# Patient Record
Sex: MALE | Race: WHITE | NOT HISPANIC OR LATINO | Employment: FULL TIME | URBAN - METROPOLITAN AREA
[De-identification: names, ages, dates, MRNs, and addresses within clinical notes are randomized per-mention and may not be internally consistent; named-entity substitution may affect disease eponyms.]

---

## 2018-01-08 ENCOUNTER — OFFICE VISIT CONVERTED (OUTPATIENT)
Dept: ORTHOPEDIC SURGERY | Facility: CLINIC | Age: 53
End: 2018-01-08
Attending: ORTHOPAEDIC SURGERY

## 2018-06-11 ENCOUNTER — OFFICE VISIT CONVERTED (OUTPATIENT)
Dept: ORTHOPEDIC SURGERY | Facility: CLINIC | Age: 53
End: 2018-06-11
Attending: ORTHOPAEDIC SURGERY

## 2018-08-06 ENCOUNTER — OFFICE VISIT CONVERTED (OUTPATIENT)
Dept: ORTHOPEDIC SURGERY | Facility: CLINIC | Age: 53
End: 2018-08-06
Attending: ORTHOPAEDIC SURGERY

## 2018-11-02 ENCOUNTER — OFFICE VISIT CONVERTED (OUTPATIENT)
Dept: ORTHOPEDIC SURGERY | Facility: CLINIC | Age: 53
End: 2018-11-02
Attending: ORTHOPAEDIC SURGERY

## 2019-03-28 ENCOUNTER — OFFICE VISIT CONVERTED (OUTPATIENT)
Dept: PODIATRY | Facility: CLINIC | Age: 54
End: 2019-03-28
Attending: PODIATRIST

## 2019-06-11 ENCOUNTER — CONVERSION ENCOUNTER (OUTPATIENT)
Dept: PODIATRY | Facility: CLINIC | Age: 54
End: 2019-06-11

## 2019-06-11 ENCOUNTER — OFFICE VISIT CONVERTED (OUTPATIENT)
Dept: PODIATRY | Facility: CLINIC | Age: 54
End: 2019-06-11
Attending: PODIATRIST

## 2019-06-25 ENCOUNTER — OFFICE VISIT CONVERTED (OUTPATIENT)
Dept: SURGERY | Facility: CLINIC | Age: 54
End: 2019-06-25
Attending: PHYSICIAN ASSISTANT

## 2019-06-25 ENCOUNTER — CONVERSION ENCOUNTER (OUTPATIENT)
Dept: SURGERY | Facility: CLINIC | Age: 54
End: 2019-06-25

## 2019-09-26 ENCOUNTER — HOSPITAL ENCOUNTER (OUTPATIENT)
Dept: OTHER | Facility: HOSPITAL | Age: 54
Discharge: HOME | End: 2019-09-26
Attending: EMERGENCY MEDICINE

## 2019-10-19 ENCOUNTER — HOSPITAL ENCOUNTER (OUTPATIENT)
Dept: MRI IMAGING | Facility: HOSPITAL | Age: 54
Discharge: HOME | End: 2019-10-19
Attending: EMERGENCY MEDICINE

## 2019-11-07 ENCOUNTER — OFFICE VISIT CONVERTED (OUTPATIENT)
Dept: ORTHOPEDIC SURGERY | Facility: CLINIC | Age: 54
End: 2019-11-07
Attending: ORTHOPAEDIC SURGERY

## 2019-12-02 ENCOUNTER — HOSPITAL ENCOUNTER (OUTPATIENT)
Dept: PERIOP | Facility: HOSPITAL | Age: 54
Setting detail: HOSPITAL OUTPATIENT SURGERY
Discharge: HOME | End: 2019-12-02
Attending: ORTHOPAEDIC SURGERY

## 2019-12-17 ENCOUNTER — OFFICE VISIT CONVERTED (OUTPATIENT)
Dept: ORTHOPEDIC SURGERY | Facility: CLINIC | Age: 54
End: 2019-12-17
Attending: ORTHOPAEDIC SURGERY

## 2020-01-28 ENCOUNTER — OFFICE VISIT CONVERTED (OUTPATIENT)
Dept: ORTHOPEDIC SURGERY | Facility: CLINIC | Age: 55
End: 2020-01-28
Attending: PHYSICIAN ASSISTANT

## 2020-03-12 ENCOUNTER — CONVERSION ENCOUNTER (OUTPATIENT)
Dept: ORTHOPEDIC SURGERY | Facility: CLINIC | Age: 55
End: 2020-03-12

## 2020-03-12 ENCOUNTER — OFFICE VISIT CONVERTED (OUTPATIENT)
Dept: ORTHOPEDIC SURGERY | Facility: CLINIC | Age: 55
End: 2020-03-12
Attending: PHYSICIAN ASSISTANT

## 2021-02-15 ENCOUNTER — OFFICE VISIT CONVERTED (OUTPATIENT)
Dept: OTOLARYNGOLOGY | Facility: CLINIC | Age: 56
End: 2021-02-15
Attending: OTOLARYNGOLOGY

## 2021-04-05 ENCOUNTER — OFFICE VISIT CONVERTED (OUTPATIENT)
Dept: OTOLARYNGOLOGY | Facility: CLINIC | Age: 56
End: 2021-04-05
Attending: OTOLARYNGOLOGY

## 2021-04-28 ENCOUNTER — OFFICE VISIT CONVERTED (OUTPATIENT)
Dept: OTOLARYNGOLOGY | Facility: CLINIC | Age: 56
End: 2021-04-28
Attending: OTOLARYNGOLOGY

## 2021-05-07 ENCOUNTER — OFFICE VISIT CONVERTED (OUTPATIENT)
Dept: OTOLARYNGOLOGY | Facility: CLINIC | Age: 56
End: 2021-05-07
Attending: OTOLARYNGOLOGY

## 2021-05-10 NOTE — H&P
"   History and Physical      Patient Name: Delgado Jerome   Patient ID: [id number]   Sex: Male   YOB: 1965    Primary Care Provider: Marco A LEBRON   Referring Provider: Marco A LEBRON    Visit Date: February 15, 2021    Provider: Bryson Ayala MD   Location: [facility]   Location Address: [address] [place] [address], [address] [place] [address]  [id number]   Location Phone: [phone]          Chief Complaint     \"My ears itch and I have trouble breathing on my nose.\"       History Of Present Illness     Delgado Jerome is a 55 year old /White male difficult for allergic rhinitis, hyperlipidemia, and GERD who presents to the office today as a consult from Marco A LEBRON for evaluation of multiple complaints.  His initial complaint involves frequent bilateral ear itching.  He tells me that this has been present for a number of years and that a Q-tip seem to be helpful.  He does report bilateral high-pitched tinnitus which has been present since childhood and feels as though he may have some hearing loss.  He reports a history of otitis externa as a child.  He denies any otalgia, otorrhea, or vertigo.  He has not undergone a recent audiogram.  Second complaint involves bilateral nasal congestion which is year-round.  It is occasionally associated with rhinorrhea.  He denies any epistaxis, prior nasal trauma, or prior nasal surgery.  He has undergone allergy testing and is currently into his second year of immunotherapy.  He is using Dymista, Singulair, fexofenadine, and saline irrigations with some improvement.  He does not smoke.       Past Medical History  Allergic rhinitis, chronic; Anemia, Unspecified; Aneurysm; Arthritis; Arthritis, right thumb CMC; Blood Clotting Disorder; Bunion; Chest pain; CMC arthritis--left thumb; Corns and callus; Foot pain, right; GERD; High cholesterol; Medial epicondylitis of elbow, left; Medial epicondylitis, right elbow; Mood disorder; " Haley neuroma, right; Numbness in both legs; Pain: Hand; Plantar warts; Prostate cancer; Prostate cancer screening; Right elbow pain; Right wrist pain; Shortness Of Air; Urgency of urination         Past Surgical History  *Metal Implant; Ankle surgery; Appendectomy; Artificial Joints/Limbs; Joint Surgery; Knee replacement, right         Medication List  Allegra Allergy 180 mg oral tablet; buspirone 15 mg oral tablet; finasteride 5 mg oral tablet; gabapentin 300 mg oral capsule; montelukast 10 mg oral tablet; naproxen 500 mg oral tablet; simvastatin 40 mg oral tablet; Sudafed 30 mg oral tablet; Tylenol PM Extra Strength  mg oral tablet; Vitamin D3 5,000 unit oral tablet         Allergy List  NO KNOWN DRUG ALLERGIES         Family Medical History  Family history of pancreatic cancer; Family history of stroke; Family history of heart disease         Social History  Alcohol (Current some day); Caffeine (Current some day); lives with other; Recreational Drug Use (Never); Second hand smoke exposure (Never); Single.; Tobacco (Never); Working         Review of Systems  · Constitutional  o Admits  o : night sweats  o Denies  o : fever, weight loss  · Eyes  o Admits  o : impaired vision  o Denies  o : discharge from eye  · HENT  o Admits  o : *See HPI  · Cardiovascular  o Denies  o : chest pain, irregular heart beats  · Respiratory  o Denies  o : shortness of breath, wheezing, coughing up blood  · Gastrointestinal  o Admits  o : reflux  o Denies  o : heartburn, vomiting blood  · Genitourinary  o Denies  o : frequency  · Integument  o Admits  o : skin dryness  o Denies  o : rash  · Neurologic  o Denies  o : seizures, loss of balance, loss of consciousness, dizziness  · Endocrine  o Admits  o : cold intolerance  o Denies  o : heat intolerance  · Heme-Lymph  o Denies  o : easy bleeding, anemia      Vitals  Date Time BP Position Site L\R Cuff Size HR RR TEMP (F) WT  HT  BMI kg/m2 BSA m2 O2 Sat FR L/min FiO2 HC      "  02/15/2021 10:55 AM        97.4 159lbs 2oz 5'  5\" 26.48 1.82             Physical Examination  · Constitutional  o Appearance  o : well developed, well-nourished, alert and in no acute distress, voice clear and strong  · Head and Face  o Head  o :   § Inspection  § : no deformities or lesions  o Face  o :   § Inspection  § : No facial lesions; House-Brackmann I/VI bilaterally  § Palpation  § : No TMJ crepitus nor  muscle tenderness bilaterally  · Eyes  o Vision  o :   § Visual Fields  § : Extraocular movements are intact. No spontaneous or gaze-induced nystagmus.  o Conjunctivae  o : clear  o Sclerae  o : clear  o Pupils and Irises  o : pupils equal, round, and reactive to light.   · Ears, Nose, Mouth and Throat  o Ears  o :   § External Ears  § : appearance within normal limits, no lesions present  § Otoscopic Examination  § : Bilateral external auditory canals are dry and flaky. Bilateral tympanic membranes with myringosclerosis.  § Hearing  § : intact to conversational voice both ears  o Nose  o :   § External Nose  § : appearance normal  § Intranasal Exam  § : mucosa within normal limits, vestibules normal, no intranasal lesions present, septum slightly deviated to the left with a prominent maxillary crest bilaterally, inferior turbinates hypertrophied, sinuses non tender to percussion  o Oral Cavity  o :   § Oral Mucosa  § : oral mucosa normal without pallor or cyanosis  § Lips  § : lip appearance normal  § Teeth  § : normal dentition for age  § Gums  § : gums pink, non-swollen, no bleeding present  § Tongue  § : tongue appearance normal; normal mobility  § Palate  § : hard palate normal, soft palate appearance normal with symmetric mobility  o Throat  o :   § Oropharynx  § : no inflammation or lesions present, tonsils within normal limits  § Hypopharynx  § : Deferred secondary to gag reflex  § Larynx  § : Deferred secondary to gag reflex  · Neck  o Inspection/Palpation  o : normal appearance, no " masses or tenderness, trachea midline; thyroid size normal, nontender, no nodules or masses present on palpation  · Respiratory  o Respiratory Effort  o : breathing unlabored  o Inspection of Chest  o : normal appearance, no retractions  · Cardiovascular  o Heart  o : regular rate and rhythm  · Lymphatic  o Neck  o : no lymphadenopathy present  o Supraclavicular Nodes  o : no lymphadenopathy present  o Preauricular Nodes  o : no lymphadenopathy present  · Skin and Subcutaneous Tissue  o General Inspection  o : Regarding face and neck - there are no rashes present, no lesions present, and no areas of discoloration  · Neurologic  o Cranial Nerves  o : cranial nerves II-XII are grossly intact bilaterally  o Gait and Station  o : normal gait, able to stand without diffculty  · Psychiatric  o Judgement and Insight  o : judgment and insight intact  o Mood and Affect  o : mood normal, affect appropriate          Assessment  · Deviated nasal septum     470/J34.2  · Nasal obstruction     478.19/J34.89  · Tinnitus, bilateral     388.30/H93.13  · Ear itching     698.9/L29.9  · Hypertrophy of both inferior nasal turbinates     478.0/J34.3      Plan  · Orders  o Audiometry, comprehensive, threshold evaluation and speech recognition [facility] ([id number]) - 388.30/H93.13 - 02/17/2021  o Tympanogram (Impedance Testing) [facility] ([id number]) - 388.30/H93.13 - 02/17/2021  · Medications  o Medications have been Reconciled  o Transition of Care or Provider Policy  · Instructions  o Impressions and findings were discussed with Mr. Jerome at great length. Currently, he is seen for eval ration of bilateral ear itching, tinnitus, and nasal congestion. He is currently on immunotherapy, Dymista, Singulair, fexofenadine, and saline irrigations without much improvement in his nasal airway. Examination today reveals slight left septal deviation with a prominent maxillary crest bilaterally. Both inferior turbinates are hypertrophied and has nasal cavity overall is  quite narrow. His ears are unremarkable aside from myringosclerosis and dry flaky external auditory canal skin. He was cautioned against Q-tip use and we discussed trying some hydrocortisone cream in his ear canals. If this is not helpful I am happy to prescribe him fluocinolone drops. I recommended an audiogram for further evaluation of his tinnitus and potential hearing loss. In regards to his nasal obstruction we discussed the pathophysiology and natural history of nasal septal deviation inferior turbinate hypertrophy. He has tried treating his allergic rhinitis aggressively without much improvement. Options for management were discussed including continued medical management versus septoplasty with inferior turbinate reduction. The risks, benefits, and alternatives were discussed. The risks include septal perforation, epistaxis, infection, recurrence of congestion, nasal scarring, altered appearance, or CSF leak. After thorough discussion he would like to think about his options and will call once he reaches a decision.  · Correspondence  o ENT Letter to Referring MD (Marco A LEBRON) - 02/17/2021            Electronically Signed by: Bryson Ayala MD -Author on February 17, 2021 08:46:02 AM

## 2021-05-11 NOTE — PROCEDURES
Procedure Note      Patient Name: Delgado Jerome   Patient ID: [id number]   Sex: Male   YOB: 1965    Primary Care Provider: Marco A LEBRON   Referring Provider: Marco A LEBRON    Visit Date: April 28, 2021    Provider: Bryson Ayala MD   Location: [facility]   Location Address: [address] [place] [address], [address]  [id number]   Location Phone: [phone]             Procedure: Bilateral myringotomy and tube placement.      Summary: The patient was brought back to the microscope room and placed supine upon the table.      The patient was brought back to the microscope room and placed reclined in the chair. The microscope was moved into position to view the patient's left ear revealing a normal-appearing external auditory canal and tympanic membrane with a scant mucoid effusion. Phenol was used to anesthetize the tympanic membrane and a radial myringotomy performed in the posterioinferior quadrant. A serous effusion was suctioned and a pressure equalization tube was placed without difficulty.  This procedure was then performed on the patient's right ear.  Ciprodex drops were instilled into the canals. The patient tolerated the procedure well.           Assessment  · ETD (Eustachian tube dysfunction), bilateral     381.81/H69.83  · Conductive hearing loss of both ears     389.06/H90.0      Plan  · Orders  o Myringotomy and Ear Tube placement ([id number]) - 381.81/H69.83, 389.06/H90.0 - 04/28/2021  o Audiometry, comprehensive, threshold evaluation and speech recognition [facility] ([id number]) - 381.81/H69.83, 389.06/H90.0 - 04/28/2021  o Tympanogram (Impedance Testing) [facility] ([id number]) - 381.81/H69.83, 389.06/H90.0 - 04/28/2021  · Instructions  o We discussed post tube care and he will follow-up in 6 weeks with an audiogram or sooner if needed.            Electronically Signed by: Bryson Ayala MD -Author on April 28, 2021 04:52:57 PM

## 2021-05-14 VITALS — WEIGHT: 159 LBS | TEMPERATURE: 98 F | BODY MASS INDEX: 26.49 KG/M2 | HEIGHT: 65 IN

## 2021-05-14 VITALS — HEIGHT: 65 IN | WEIGHT: 159.12 LBS | TEMPERATURE: 97.4 F | BODY MASS INDEX: 26.51 KG/M2

## 2021-05-14 NOTE — PROGRESS NOTES
"   Progress Note      Patient Name: Delgado Jerome   Patient ID: [id number]   Sex: Male   YOB: 1965    Primary Care Provider: Marco A LEBRON   Referring Provider: Marco A LEBRON    Visit Date: April 5, 2021    Provider: Bryson Ayala MD   Location: [facility] [place] [facility]   Location Address: [address] [place] [address], [address] [place] [address]  [id number]   Location Phone: [phone]          Chief Complaint     \"My ears are not itching anymore.\"       History Of Present Illness     Delgado Jerome is a 55 year old /White male difficult for allergic rhinitis, hyperlipidemia, and GERD who returns today for follow-up of bilateral ear itching, tinnitus, hearing loss, and nasal congestion.  He was originally seen on 2/15/2021.  Please see that note for further details.  In short he had been experiencing bilateral high-pitched tinnitus since childhood and bilateral nasal congestion with clear rhinorrhea for which she had been using Dymista, Singulair, fexofenadine, and saline irrigations with some improvement.  He does not smoke.  Examination that day revealed a slight left septal deviation and prominent maxillary crest bilaterally as well as inferior turbinate hypertrophy.  We discussed using hydrocortisone cream in his dry flaky ear canals and an audiogram and tympanogram were ordered for further evaluation of his tinnitus and hearing loss.    He tells me that the hydrocortisone cream has been helpful for his ear itching.  He does report some ear popping and pressure but denies any otalgia, otorrhea, or vertigo.  His tinnitus is stable.  He continues to experience bilateral nasal congestion and is still using Dymista, Singulair, fexofenadine, and saline.  Audiogram on 4/5/2021 revealed right normal downsloping to mild to moderate conductive loss and left mild rising to normal downsloping to mild rising to normal downsloping to moderate conductive loss.  SRT is 10 on the right and 15 on " the left.  Speech discrimination is 96% on the right 90% on the left at 60 dB.  Tympanograms were type C bilaterally.            Past Medical History  Allergic rhinitis, chronic; Anemia, Unspecified; Aneurysm; Arthritis; Arthritis, right thumb CMC; Blood Clotting Disorder; Bunion; Chest pain; CMC arthritis--left thumb; Corns and callus; Deviated nasal septum; Ear itching; Foot pain, right; GERD; High cholesterol; Hypertrophy of both inferior nasal turbinates; Medial epicondylitis of elbow, left; Medial epicondylitis, right elbow; Mood disorder; Haley neuroma, right; Nasal obstruction; Numbness in both legs; Pain: Hand; Plantar warts; Prostate cancer; Prostate cancer screening; Right elbow pain; Right wrist pain; Shortness Of Air; Tinnitus, bilateral; Urgency of urination         Past Surgical History  *Metal Implant; Ankle surgery; Appendectomy; Artificial Joints/Limbs; Joint Surgery; Knee replacement, right         Medication List  Allegra Allergy 180 mg oral tablet; azelastine-fluticasone 137-50 mcg/spray nasal spray,non-aerosol; buspirone 15 mg oral tablet; finasteride 5 mg oral tablet; gabapentin 300 mg oral capsule; montelukast 10 mg oral tablet; naproxen 500 mg oral tablet; simvastatin 40 mg oral tablet; Sudafed 30 mg oral tablet; tolterodine 2 mg oral tablet; Tylenol PM Extra Strength  mg oral tablet; Vitamin D3 5,000 unit oral tablet         Allergy List  NO KNOWN DRUG ALLERGIES         Family Medical History  Family history of pancreatic cancer; Family history of stroke; Family history of heart disease         Social History  Alcohol (Current some day); Caffeine (Current some day); lives with other; Recreational Drug Use (Never); Second hand smoke exposure (Never); Single.; Tobacco (Never); Working         Review of Systems  · Constitutional  o Denies  o : fever, night sweats, weight loss  · Eyes  o Denies  o : discharge from eye, impaired vision  · HENT  o Admits  o : *See  "HPI  · Cardiovascular  o Denies  o : chest pain, irregular heart beats  · Respiratory  o Denies  o : shortness of breath, wheezing, coughing up blood  · Gastrointestinal  o Denies  o : heartburn, reflux, vomiting blood  · Genitourinary  o Denies  o : frequency  · Integument  o Denies  o : rash, skin dryness  · Neurologic  o Denies  o : seizures, loss of balance, loss of consciousness, dizziness  · Endocrine  o Denies  o : cold intolerance, heat intolerance  · Heme-Lymph  o Denies  o : easy bleeding, anemia      Vitals  Date Time BP Position Site L\R Cuff Size HR RR TEMP (F) WT  HT  BMI kg/m2 BSA m2 O2 Sat FR L/min FiO2 HC       04/05/2021 01:49 PM        98 159lbs 0oz 5'  5\" 26.46 1.82             Physical Examination  · Constitutional  o Appearance  o : well developed, well-nourished, alert and in no acute distress, voice clear and strong  · Head and Face  o Head  o :   § Inspection  § : no deformities or lesions  o Face  o :   § Inspection  § : No facial lesions; House-Brackmann I/VI bilaterally  § Palpation  § : No TMJ crepitus nor  muscle tenderness bilaterally  · Eyes  o Vision  o :   § Visual Fields  § : Extraocular movements are intact. No spontaneous or gaze-induced nystagmus.  o Conjunctivae  o : clear  o Sclerae  o : clear  o Pupils and Irises  o : pupils equal, round, and reactive to light.   · Ears, Nose, Mouth and Throat  o Ears  o :   § External Ears  § : appearance within normal limits, no lesions present  § Otoscopic Examination  § : Bilateral external auditory canals are dry and flaky. Bilateral tympanic membranes with scattered myringosclerosis  § Hearing  § : intact to conversational voice both ears  o Nose  o :   § External Nose  § : appearance normal  § Intranasal Exam  § : mucosa within normal limits, vestibules normal, no intranasal lesions present, septum slightly deviated to the left with prominent maxillary crest bilaterally and inferior turbinate hypertrophy,, sinuses non tender " to percussion  o Oral Cavity  o :   § Oral Mucosa  § : oral mucosa normal without pallor or cyanosis  § Lips  § : lip appearance normal  § Teeth  § : normal dentition for age  § Gums  § : gums pink, non-swollen, no bleeding present  § Tongue  § : tongue appearance normal; normal mobility  § Palate  § : hard palate normal, soft palate appearance normal with symmetric mobility  o Throat  o :   § Oropharynx  § : no inflammation or lesions present, tonsils within normal limits  · Neck  o Inspection/Palpation  o : normal appearance, no masses or tenderness, trachea midline; thyroid size normal, nontender, no nodules or masses present on palpation  · Respiratory  o Respiratory Effort  o : breathing unlabored  o Inspection of Chest  o : normal appearance, no retractions  · Cardiovascular  o Heart  o : regular rate and rhythm  · Lymphatic  o Neck  o : no lymphadenopathy present  o Supraclavicular Nodes  o : no lymphadenopathy present  o Preauricular Nodes  o : no lymphadenopathy present  · Skin and Subcutaneous Tissue  o General Inspection  o : Regarding face and neck - there are no rashes present, no lesions present, and no areas of discoloration  · Neurologic  o Cranial Nerves  o : cranial nerves II-XII are grossly intact bilaterally  o Gait and Station  o : normal gait, able to stand without diffculty  · Psychiatric  o Judgement and Insight  o : judgment and insight intact  o Mood and Affect  o : mood normal, affect appropriate          Assessment  · Deviated nasal septum     470/J34.2  · Nasal obstruction     478.19/J34.89  · Conductive hearing loss, bilateral     389.06/H90.0  · Tinnitus, bilateral     388.30/H93.13  · ETD (Eustachian tube dysfunction), bilateral     381.81/H69.83  · Hypertrophy of both inferior nasal turbinates     478.0/J34.3      Plan  · Medications  o Medications have been Reconciled  o Transition of Care or Provider Policy  · Instructions  o Impressions and findings were discussed Mr. Jerome at  great length. Currently, he reports intermittent ear popping and pressure as well as stable tinnitus. We reviewed the results of his 4/5/2021 audiogram which revealed bilateral normal to mild to moderate conductive loss. Tympanograms are type C bilaterally and consistent with eustachian tube dysfunction. We discussed the pathophysiology and natural history of this condition. It is difficult to know if this is partly responsible for his conductive loss. Options for further evaluation and management were discussed including potential tube placement. The risks, benefits, and alternatives were discussed. The risks include persistent drainage from the tubes occasionally requiring removal, blockage of the tubes by drainage, early displacement of the tubes, and tympanic membrane perforation. After a thorough discussion he would like to pursue bilateral myringotomy and tube placement which will be performed in clinic. He would like to hold off on any potential nasal surgeries.            Electronically Signed by: Bryson Ayala MD -Author on April 7, 2021 08:25:52 AM

## 2021-05-15 VITALS — WEIGHT: 147 LBS | BODY MASS INDEX: 24.49 KG/M2 | RESPIRATION RATE: 16 BRPM | HEIGHT: 65 IN

## 2021-05-15 VITALS — OXYGEN SATURATION: 96 % | WEIGHT: 147.37 LBS | HEIGHT: 65 IN | HEART RATE: 60 BPM | BODY MASS INDEX: 24.55 KG/M2

## 2021-05-15 VITALS
DIASTOLIC BLOOD PRESSURE: 67 MMHG | OXYGEN SATURATION: 100 % | SYSTOLIC BLOOD PRESSURE: 92 MMHG | WEIGHT: 149 LBS | BODY MASS INDEX: 24.83 KG/M2 | HEART RATE: 68 BPM | HEIGHT: 65 IN

## 2021-05-15 VITALS — WEIGHT: 152.12 LBS | BODY MASS INDEX: 25.34 KG/M2 | HEART RATE: 89 BPM | OXYGEN SATURATION: 99 % | HEIGHT: 65 IN

## 2021-05-15 VITALS — HEIGHT: 65 IN | OXYGEN SATURATION: 98 % | HEART RATE: 78 BPM | BODY MASS INDEX: 24.49 KG/M2 | WEIGHT: 147 LBS

## 2021-05-15 VITALS
HEIGHT: 65 IN | WEIGHT: 148 LBS | SYSTOLIC BLOOD PRESSURE: 128 MMHG | DIASTOLIC BLOOD PRESSURE: 82 MMHG | BODY MASS INDEX: 24.66 KG/M2 | OXYGEN SATURATION: 97 % | HEART RATE: 73 BPM

## 2021-05-15 VITALS — WEIGHT: 148 LBS | HEIGHT: 65 IN | BODY MASS INDEX: 24.66 KG/M2 | HEART RATE: 71 BPM

## 2021-05-16 VITALS — HEIGHT: 65 IN | HEART RATE: 78 BPM | WEIGHT: 146 LBS | BODY MASS INDEX: 24.32 KG/M2 | OXYGEN SATURATION: 95 %

## 2021-05-16 VITALS — HEART RATE: 71 BPM | HEIGHT: 65 IN | WEIGHT: 147.5 LBS | OXYGEN SATURATION: 97 % | BODY MASS INDEX: 24.57 KG/M2

## 2021-05-16 VITALS — BODY MASS INDEX: 23.32 KG/M2 | OXYGEN SATURATION: 93 % | HEIGHT: 65 IN | HEART RATE: 75 BPM | WEIGHT: 140 LBS

## 2021-05-16 VITALS — WEIGHT: 149 LBS | HEIGHT: 65 IN | OXYGEN SATURATION: 96 % | BODY MASS INDEX: 24.83 KG/M2 | HEART RATE: 68 BPM

## 2021-06-05 NOTE — PROGRESS NOTES
"   Progress Note      Patient Name: Delgado Jerome   Patient ID: [id number]   Sex: Male   YOB: 1965    Primary Care Provider: Marco A LEBRON   Referring Provider: Marco A LEBRON    Visit Date: May 7, 2021    Provider: Bryson Ayala MD   [place] [facility]   Location Address: [address] [place] [address], [address] [place] [address]  [id number]   Location Phone: [phone]          Chief Complaint     \"My left ear almost feels like it did when I was a kid before an infection\"       History Of Present Illness     Delgado Jerome is a 55 year old /White male difficult for allergic rhinitis, hyperlipidemia, and GERD who returns today for follow-up of bilateral ear itching, tinnitus, hearing loss, and nasal congestion.  He was originally seen on 2/15/2021.  Please see that note for further details.  In short he had been experiencing bilateral high-pitched tinnitus since childhood and bilateral nasal congestion with clear rhinorrhea for which she had been using Dymista, Singulair, fexofenadine, and saline irrigations with some improvement.  He does not smoke.  Examination that day revealed a slight left septal deviation and prominent maxillary crest bilaterally as well as inferior turbinate hypertrophy.  We discussed using hydrocortisone cream in his dry flaky ear canals and an audiogram and tympanogram were ordered for further evaluation of his tinnitus and hearing loss. Audiogram on 4/5/2021 revealed right normal downsloping to mild to moderate conductive loss and left mild rising to normal downsloping to mild rising to normal downsloping to moderate conductive loss.  SRT is 10 on the right and 15 on the left.  Speech discrimination is 96% on the right 90% on the left at 60 dB.  Tympanograms were type C bilaterally.     He returns today for follow-up status post bilateral myringotomy and tube placement in clinic on 4/28/2021.  He tells me that he feels like his hearing is " "somewhat better but his bilateral high-pitched tinnitus is maybe a little bit worse.  Yesterday he felt as though there was an almost \"throbbing or whooshing\" feeling in his left ear.  It is not as severe today.  He denies any otorrhea or vertigo.       Past Medical History  Allergic rhinitis, chronic; Anemia, Unspecified; Aneurysm; Arthritis; Arthritis, right thumb CMC; Blood Clotting Disorder; Bunion; Chest pain; CMC arthritis--left thumb; Conductive hearing loss, bilateral; Corns and callus; Deviated nasal septum; Ear itching; ETD (Eustachian tube dysfunction), bilateral; Foot pain, right; GERD; High cholesterol; Hypertrophy of both inferior nasal turbinates; Medial epicondylitis of elbow, left; Medial epicondylitis, right elbow; Mood disorder; Haley neuroma, right; Nasal obstruction; Numbness in both legs; Pain: Hand; Plantar warts; Prostate cancer; Prostate cancer screening; Right elbow pain; Right wrist pain; Shortness Of Air; Tinnitus, bilateral; Urgency of urination         Past Surgical History  *Metal Implant; Ankle surgery; Appendectomy; Artificial Joints/Limbs; Joint Surgery; Knee replacement, right         Medication List  Allegra Allergy 180 mg oral tablet; azelastine-fluticasone 137-50 mcg/spray nasal spray,non-aerosol; buspirone 15 mg oral tablet; finasteride 5 mg oral tablet; gabapentin 300 mg oral capsule; montelukast 10 mg oral tablet; naproxen 500 mg oral tablet; simvastatin 40 mg oral tablet; Sudafed 30 mg oral tablet; tolterodine 2 mg oral tablet; Tylenol PM Extra Strength  mg oral tablet; Vitamin D3 5,000 unit oral tablet         Allergy List  NO KNOWN DRUG ALLERGIES         Family Medical History  Family history of pancreatic cancer; Family history of stroke; Family history of heart disease         Social History  Alcohol (Current some day); Caffeine (Current some day); lives with other; Recreational Drug Use (Never); Second hand smoke exposure (Never); Single.; Tobacco (Never); " "Working         Review of Systems  · Constitutional  o Denies  o : fever, night sweats, weight loss  · Eyes  o Denies  o : discharge from eye, impaired vision  · HENT  o Admits  o : *See HPI  · Cardiovascular  o Denies  o : chest pain, irregular heart beats  · Respiratory  o Denies  o : shortness of breath, wheezing, coughing up blood  · Gastrointestinal  o Denies  o : heartburn, reflux, vomiting blood  · Genitourinary  o Denies  o : frequency  · Integument  o Denies  o : rash, skin dryness  · Neurologic  o Denies  o : seizures, loss of balance, loss of consciousness, dizziness  · Endocrine  o Denies  o : cold intolerance, heat intolerance  · Heme-Lymph  o Denies  o : easy bleeding, anemia      Vitals  Date Time BP Position Site L\R Cuff Size HR RR TEMP (F) WT  HT  BMI kg/m2 BSA m2 O2 Sat FR L/min FiO2 HC       05/07/2021 11:33 AM        97.9 158lbs 2oz 5'  5\" 26.31 1.81             Physical Examination  · Constitutional  o Appearance  o : well developed, well-nourished, alert and in no acute distress, voice clear and strong  · Head and Face  o Head  o :   § Inspection  § : no deformities or lesions  o Face  o :   § Inspection  § : No facial lesions; House-Brackmann I/VI bilaterally  § Palpation  § : No TMJ crepitus nor  muscle tenderness bilaterally  · Eyes  o Vision  o :   § Visual Fields  § : Extraocular movements are intact. No spontaneous or gaze-induced nystagmus.  o Conjunctivae  o : clear  o Sclerae  o : clear  o Pupils and Irises  o : pupils equal, round, and reactive to light.   · Ears, Nose, Mouth and Throat  o Ears  o :   § External Ears  § : appearance within normal limits, no lesions present  § Otoscopic Examination  § : Bilateral external auditory canals are normal in appearance. Bilateral tympanic membranes with pressure equalization tubes in place and patent.  § Hearing  § : intact to conversational voice both ears  o Nose  o :   § External Nose  § : appearance normal  § Intranasal " Exam  § : mucosa within normal limits, vestibules normal, no intranasal lesions present, septum midline, sinuses non tender to percussion  o Oral Cavity  o :   § Oral Mucosa  § : oral mucosa normal without pallor or cyanosis  § Lips  § : lip appearance normal  § Teeth  § : normal dentition for age  § Gums  § : gums pink, non-swollen, no bleeding present  § Tongue  § : tongue appearance normal; normal mobility  § Palate  § : hard palate normal, soft palate appearance normal with symmetric mobility  o Throat  o :   § Oropharynx  § : no inflammation or lesions present, tonsils within normal limits  · Neck  o Inspection/Palpation  o : normal appearance, no masses or tenderness, trachea midline; thyroid size normal, nontender, no nodules or masses present on palpation  · Respiratory  o Respiratory Effort  o : breathing unlabored  o Inspection of Chest  o : normal appearance, no retractions  · Cardiovascular  o Heart  o : regular rate and rhythm  · Lymphatic  o Neck  o : no lymphadenopathy present  o Supraclavicular Nodes  o : no lymphadenopathy present  o Preauricular Nodes  o : no lymphadenopathy present  · Skin and Subcutaneous Tissue  o General Inspection  o : Regarding face and neck - there are no rashes present, no lesions present, and no areas of discoloration  · Neurologic  o Cranial Nerves  o : cranial nerves II-XII are grossly intact bilaterally  o Gait and Station  o : normal gait, able to stand without diffculty  · Psychiatric  o Judgement and Insight  o : judgment and insight intact  o Mood and Affect  o : mood normal, affect appropriate          Assessment  · Conductive hearing loss, bilateral     389.06/H90.0  · Ear pressure, left     388.8/H93.8X2  · ETD (Eustachian tube dysfunction), bilateral     381.81/H69.83      Plan  · Medications  o dexamethasone sodium phosphate 0.1 % ophthalmic (eye) drops   SIG: Place 3-5 drops in the affected ear twice daily 14 days   DISP: (1) Bottle with 1 refills  Prescribed  on 05/07/2021     o Medications have been Reconciled  o Transition of Care or Provider Policy  · Instructions  o Impressions and findings were discussed Mr. Jerome at great length. Currently, both tubes are in place and patent without evidence of infection. We will try him on a course of dexamethasone drops in case there is some inflammation and if things are no better we will move up his audiogram.            Electronically Signed by: Bryson Ayala MD -Author on May 7, 2021 12:00:04 PM

## 2021-06-11 RX ORDER — TOLTERODINE TARTRATE 2 MG/1
2 TABLET, EXTENDED RELEASE ORAL DAILY
COMMUNITY
End: 2022-11-16 | Stop reason: SDUPTHER

## 2021-06-11 RX ORDER — MONTELUKAST SODIUM 10 MG/1
TABLET ORAL
COMMUNITY

## 2021-06-11 RX ORDER — SIMVASTATIN 40 MG
TABLET ORAL
COMMUNITY

## 2021-06-11 RX ORDER — GABAPENTIN 300 MG/1
CAPSULE ORAL
COMMUNITY
End: 2022-11-16 | Stop reason: SDUPTHER

## 2021-06-11 RX ORDER — BUSPIRONE HYDROCHLORIDE 15 MG/1
TABLET ORAL
COMMUNITY

## 2021-06-11 RX ORDER — PSEUDOEPHEDRINE HCL 30 MG
TABLET ORAL
COMMUNITY
End: 2021-06-14

## 2021-06-11 RX ORDER — FLUTICASONE PROPIONATE 50 MCG
SPRAY, SUSPENSION (ML) NASAL
COMMUNITY
End: 2021-06-14

## 2021-06-11 RX ORDER — AZELASTINE HYDROCHLORIDE, FLUTICASONE PROPIONATE 137; 50 UG/1; UG/1
SPRAY, METERED NASAL
COMMUNITY

## 2021-06-11 RX ORDER — FINASTERIDE 5 MG/1
TABLET, FILM COATED ORAL
COMMUNITY

## 2021-06-11 RX ORDER — ACETAMINOPHEN/DIPHENHYDRAMINE 500MG-25MG
1 TABLET ORAL NIGHTLY
COMMUNITY

## 2021-06-11 RX ORDER — ESOMEPRAZOLE MAGNESIUM 40 MG/1
CAPSULE, DELAYED RELEASE ORAL
COMMUNITY
End: 2022-11-16 | Stop reason: SDUPTHER

## 2021-06-11 RX ORDER — AZELASTINE HYDROCHLORIDE 0.5 MG/ML
SOLUTION/ DROPS OPHTHALMIC
COMMUNITY
End: 2021-06-14

## 2021-06-14 ENCOUNTER — PROCEDURE VISIT (OUTPATIENT)
Dept: OTOLARYNGOLOGY | Facility: CLINIC | Age: 56
End: 2021-06-14

## 2021-06-14 ENCOUNTER — OFFICE VISIT (OUTPATIENT)
Dept: OTOLARYNGOLOGY | Facility: CLINIC | Age: 56
End: 2021-06-14

## 2021-06-14 VITALS — BODY MASS INDEX: 26.36 KG/M2 | HEIGHT: 65 IN | WEIGHT: 158.2 LBS | TEMPERATURE: 98.1 F

## 2021-06-14 DIAGNOSIS — H90.3 SENSORY HEARING LOSS, BILATERAL: ICD-10-CM

## 2021-06-14 DIAGNOSIS — H93.13 TINNITUS OF BOTH EARS: ICD-10-CM

## 2021-06-14 DIAGNOSIS — H69.83 DYSFUNCTION OF BOTH EUSTACHIAN TUBES: Primary | ICD-10-CM

## 2021-06-14 DIAGNOSIS — H90.0 CONDUCTIVE HEARING LOSS OF BOTH EARS: ICD-10-CM

## 2021-06-14 DIAGNOSIS — H93.8X2 EAR PRESSURE, LEFT: ICD-10-CM

## 2021-06-14 PROCEDURE — 99213 OFFICE O/P EST LOW 20 MIN: CPT | Performed by: OTOLARYNGOLOGY

## 2021-06-14 PROCEDURE — 92557 COMPREHENSIVE HEARING TEST: CPT | Performed by: AUDIOLOGIST

## 2021-06-14 PROCEDURE — 92567 TYMPANOMETRY: CPT | Performed by: AUDIOLOGIST

## 2021-06-14 RX ORDER — TOLTERODINE 2 MG/1
2 CAPSULE, EXTENDED RELEASE ORAL DAILY
COMMUNITY
Start: 2021-01-05 | End: 2021-06-14

## 2021-06-14 RX ORDER — NAPROXEN 500 MG/1
TABLET ORAL
COMMUNITY
Start: 2021-06-13 | End: 2021-06-14

## 2021-06-14 RX ORDER — FINASTERIDE 5 MG/1
5 TABLET, FILM COATED ORAL DAILY
COMMUNITY
Start: 2021-01-05 | End: 2021-06-14

## 2021-06-14 RX ORDER — AZELASTINE HYDROCHLORIDE, FLUTICASONE PROPIONATE 137; 50 UG/1; UG/1
SPRAY, METERED NASAL
COMMUNITY
Start: 2021-03-08 | End: 2021-06-14

## 2021-06-14 RX ORDER — FLUTICASONE PROPIONATE 50 MCG
2 SPRAY, SUSPENSION (ML) NASAL DAILY
COMMUNITY
Start: 2021-01-05 | End: 2021-06-14

## 2021-06-14 RX ORDER — FEXOFENADINE HCL 180 MG/1
180 TABLET ORAL DAILY
COMMUNITY
Start: 2021-01-05 | End: 2022-12-28

## 2021-06-14 RX ORDER — SIMVASTATIN 40 MG
40 TABLET ORAL DAILY
COMMUNITY
Start: 2021-01-05 | End: 2021-06-14

## 2021-06-14 RX ORDER — NAPROXEN 500 MG/1
500 TABLET ORAL
COMMUNITY
Start: 2021-03-17 | End: 2021-09-13

## 2021-06-14 RX ORDER — GABAPENTIN 600 MG/1
TABLET ORAL
COMMUNITY
Start: 2021-03-17 | End: 2022-11-16 | Stop reason: SDUPTHER

## 2021-06-14 RX ORDER — BUSPIRONE HYDROCHLORIDE 15 MG/1
7.5 TABLET ORAL
COMMUNITY
Start: 2021-01-05 | End: 2021-06-14

## 2021-06-14 RX ORDER — TOLTERODINE 2 MG/1
2 CAPSULE, EXTENDED RELEASE ORAL DAILY
COMMUNITY
Start: 2021-03-30

## 2021-06-14 RX ORDER — ACETAMINOPHEN 500 MG
500 TABLET ORAL DAILY
COMMUNITY

## 2021-06-14 RX ORDER — MONTELUKAST SODIUM 10 MG/1
10 TABLET ORAL DAILY
COMMUNITY
Start: 2021-01-05 | End: 2021-06-14

## 2021-06-14 RX ORDER — FOLIC ACID 1 MG/1
1000 TABLET ORAL DAILY
COMMUNITY
Start: 2021-04-14

## 2021-06-14 NOTE — PROGRESS NOTES
"Patient Name: Delgado Jerome   Visit Date: 06/14/2021   Patient ID: [id number]  Provider: Bryson Ayala MD    Sex: male  [facility]   YOB: 1965  Location Address: [address] [place] [address], [address],?[place]?[id number]    Primary Care Provider Marco A Thomas PA  Location Phone: [phone]    Referring Provider: No ref. provider found        Chief Complaint  No chief complaint on file.    History of Present Illness  Delgado Jerome is a 55 y.o. male who presents to [facility] today for follow-up of bilateral conductive hearing loss, left ear pressure, and bilateral eustachian tube dysfunction.  He was originally seen on 2/15/2021 at which time he reported bilateral high-pitched tinnitus since childhood and nasal congestion for which he had been using Dymista, Singulair, fexofenadine, and saline irrigations with some improvement.  Examination that day revealed a slight left septal deviation and prominent maxillary crest bilaterally as well as inferior turbinate hypertrophy.  Audiogram on 4/5/2021 revealed right normal downsloping to mild to moderate conductive loss and left mild rising to normal downsloping to mild rising to normal downsloping to moderate conductive loss.  SRT was 10 on the right and 15 on the left.  Speech discrimination was 96% on the right and 90% on the left at 60 dB.  Tympanograms are type C bilaterally.    He returns today for follow-up status post bilateral myringotomy and tube placement in clinic on 4/28/2021.  He was last seen on 5/7/2021 at which time he felt as though there was a \"throbbing or whooshing\" feeling in his left ear.  Tubes were in place and patent without evidence of infection but he was placed on dexamethasone drops.  He tells me that the throbbing sensation in his ears has resolved.  He feels like his tinnitus is stable.  He denies any otorrhea or vertigo.  Audiogram on 6/14/2021 " revealed bilateral normal downsloping to mild to severe high-frequency sensorineural hearing loss on the right and mild high-frequency sensorineural hearing loss on the left.  SRT is 15 bilaterally.  Speech discrimination is 100% bilaterally at 50 dB.  Tympanograms are type B with expanded volumes.    Past Medical History:   Diagnosis Date   • Anemia, unspecified    • Aneurysm (CMS/[facility])    • Arthritis    • Arthritis of carpometacarpal (CMC) joint of left thumb 11/15/2017   • Arthritis of carpometacarpal (CMC) joint of right thumb 01/10/2018   • Blood clotting disorder (CMS/[facility])    • Bunion    • Chest pain    • Chronic allergic rhinitis    • Conductive hearing loss, bilateral    • Corns and callus    • Deviated nasal septum    • Ear itching    • ETD (Eustachian tube dysfunction), bilateral    • Foot pain, 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   • GERD (gastroesophageal reflux disease)    • High cholesterol    • Hypertrophy of both inferior nasal turbinates    • Medial epicondylitis of elbow, left 11/14/2017   • Medial epicondylitis of elbow, right 01/10/2018   • Mood disorder (CMS/[facility])    • Haley neuroma, 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   • Nasal obstruction    • Numbness in both legs    • Pain, hand 11/15/2017   • Plantar warts    • Prostate cancer (CMS/[facility]) 05/15/2017   • Prostate cancer screening 07/25/2017   • Right elbow pain 01/10/2018   • Right wrist pain 01/10/2018   • Shortness of breath    • Tinnitus, bilateral    • Urgency of urination 05/15/2017       Past Surgical History:   Procedure Laterality Date   • ANKLE SURGERY     • ANKLE SURGERY     • APPENDECTOMY     • OTHER SURGICAL HISTORY      Metal Implant   • OTHER SURGICAL HISTORY      Artifical Joints/Limbs   • REPLACEMENT TOTAL KNEE      Right knee         Current Outpatient Medications:   •  acetaminophen (TYLENOL) 500 MG tablet, Take 500 mg by mouth Daily., Disp: , Rfl:   •  Azelastine-Fluticasone 137-50 MCG/ACT suspension, azelastine-fluticasone 137-50 mcg/spray nasal  "spray,non-aerosol spray 1 spray in each nostril by intranasal route 2 times per day   Active, Disp: , Rfl:   •  busPIRone (BUSPAR) 15 MG tablet, buspirone 15 mg oral tablet take 0.5 tablet (7.5 mg) by oral route 2 times per day   Active, Disp: , Rfl:   •  Cholecalciferol 125 MCG (5000 UT) tablet, Vitamin D3 5,000 unit oral tablet take 1 tablet by oral route daily   Active, Disp: , Rfl:   •  diphenhydrAMINE-acetaminophen (Tylenol PM Extra Strength)  MG tablet per tablet, 1 tablet Every Night., Disp: , Rfl:   •  esomeprazole (nexIUM) 40 MG capsule, , Disp: , Rfl:   •  fexofenadine (ALLEGRA) 180 MG tablet, Take 180 mg by mouth Daily., Disp: , Rfl:   •  finasteride (PROSCAR) 5 MG tablet, finasteride 5 mg oral tablet take 0.5 tablet by oral route daily   Active, Disp: , Rfl:   •  gabapentin (NEURONTIN) 300 MG capsule, , Disp: , Rfl:   •  gabapentin (NEURONTIN) 600 MG tablet, , Disp: , Rfl:   •  montelukast (SINGULAIR) 10 MG tablet, montelukast 10 mg oral tablet take 1 tablet (10 mg) by oral route once daily in the evening   Active, Disp: , Rfl:   •  naproxen (NAPROSYN) 500 MG tablet, Take 500 mg by mouth., Disp: , Rfl:   •  simvastatin (ZOCOR) 40 MG tablet, simvastatin 40 mg oral tablet take 1 tablet (40 mg) by oral route once daily in the evening   Active, Disp: , Rfl:   •  tolterodine (DETROL) 2 MG tablet, Take 2 mg by mouth Daily., Disp: , Rfl:   •  tolterodine LA (DETROL LA) 2 MG 24 hr capsule, Take 2 mg by mouth Daily., Disp: , Rfl:   •  vitamin D3 125 MCG (5000 UT) capsule capsule, Take 1 capsule by mouth Daily., Disp: , Rfl:   •  folic acid (FOLVITE) 1 MG tablet, Take 1,000 mcg by mouth Daily., Disp: , Rfl:      No Known Allergies    Social History     Tobacco Use   • Smoking status: Never Smoker   Substance Use Topics   • Alcohol use: Yes     Comment: Drinks weekly; beer   • Drug use: Not on file        Objective     Vital Signs:   Temp 98.1 °F (36.7 °C) (Temporal)   Ht 165.1 cm (65\")   Wt 71.8 kg (158 lb " 3.2 oz)   BMI 26.33 kg/m²       Physical Exam    General: Well developed, well nourished patient of stated age in no acute distress. Voice is strong and clear.   Head: Normocephalic and atraumatic.   Face: No lesions. House-Brackmann I/VI bilaterally.   muscles and temporomandibular joint nontender to palpation.  No TMJ crepitus.  Eyes: PERRLA, sclerae anicteric, no conjunctival injection. Extra ocular movements are intact and full. No nystagmus.   Ears: Auricles are normal in appearance. Bilateral external auditory canals are unremarkable. Bilateral tympanic membranes with pressure equalization tubes in place and patent.  No otorrhea.  Hearing normal to conversational voice.   Nose: External nose is normal in appearance. Bilateral nares are patent with normal appearing mucosa. Septum deviated to the left. Turbinates are  hypertrophied. No lesions.   Oral Cavity: Lips are normal in appearance. Oral mucosa is unremarkable. Gingiva is unremarkable. Normal dentition for age. Tongue is unremarkable with good movement. Hard palate is unremarkable.   Oropharynx: Soft palate is unremarkable with full movement. Uvula is unremarkable. Bilateral tonsils are unremarkable. Posterior oropharynx is unremarkable.    Larynx and hypopharynx: Deferred secondary to gag reflex.  Neck: Supple.  No mass.  Nontender to palpation.  Trachea midline. Thyroid normal size and without nodules to palpation.   Lymphatic: No lymphadenopathy upon palpation.   Psychiatric: Appropriate affect, cooperative   Neurologic: Oriented x 3, strength symmetric in all extremities, Cranial Nerves II-XII are grossly intact to confrontation   Skin: Warm and dry. No rashes.    Procedures     Result Review :               Assessment and Plan    Diagnoses and all orders for this visit:    1. Dysfunction of both eustachian tubes (Primary)  Both tubes are in place and patent and without any evidence of infection or inflammation.  We reviewed his 6/14/2021  audiogram which revealed some improvement in the low to mid frequencies for the left ear and some worsening in the mid to high frequencies in the right ear.  All in all, it is fairly stable.  We again discussed coping mechanisms for tinnitus and he will follow up in 1 year with a repeat audiogram or sooner if needed.  2. Conductive hearing loss of both ears    3. Ear pressure, left          Follow Up   Return in about 1 year (around 6/14/2022) for with audio.  Patient was given instructions and counseling regarding his condition or for health maintenance advice. Please see specific information pulled into the AVS if appropriate.

## 2021-07-15 VITALS — BODY MASS INDEX: 26.34 KG/M2 | WEIGHT: 158.12 LBS | HEIGHT: 65 IN | TEMPERATURE: 97.9 F

## 2021-09-27 ENCOUNTER — TRANSCRIBE ORDERS (OUTPATIENT)
Dept: ADMINISTRATIVE | Facility: HOSPITAL | Age: 56
End: 2021-09-27

## 2021-09-27 DIAGNOSIS — M54.50 LOW BACK PAIN, UNSPECIFIED BACK PAIN LATERALITY, UNSPECIFIED CHRONICITY, UNSPECIFIED WHETHER SCIATICA PRESENT: Primary | ICD-10-CM

## 2021-10-14 ENCOUNTER — APPOINTMENT (OUTPATIENT)
Dept: MRI IMAGING | Facility: HOSPITAL | Age: 56
End: 2021-10-14

## 2021-10-18 ENCOUNTER — TRANSCRIBE ORDERS (OUTPATIENT)
Dept: ADMINISTRATIVE | Facility: HOSPITAL | Age: 56
End: 2021-10-18

## 2021-10-18 DIAGNOSIS — M54.10 RADICULOPATHY, UNSPECIFIED SPINAL REGION: Primary | ICD-10-CM

## 2021-10-19 ENCOUNTER — HOSPITAL ENCOUNTER (OUTPATIENT)
Dept: MRI IMAGING | Facility: HOSPITAL | Age: 56
Discharge: HOME | End: 2021-10-19
Admitting: PHYSICIAN ASSISTANT

## 2021-10-19 DIAGNOSIS — M54.50 LOW BACK PAIN, UNSPECIFIED BACK PAIN LATERALITY, UNSPECIFIED CHRONICITY, UNSPECIFIED WHETHER SCIATICA PRESENT: ICD-10-CM

## 2021-10-19 PROCEDURE — 72148 MRI LUMBAR SPINE W/O DYE: CPT

## 2022-10-04 ENCOUNTER — TELEPHONE (OUTPATIENT)
Dept: OTOLARYNGOLOGY | Facility: CLINIC | Age: 57
End: 2022-10-04

## 2022-10-04 NOTE — TELEPHONE ENCOUNTER
Caller:  JONELLE TORIBIO    Relationship: SELF     Best call back number: [phone]    What is your medical concern? PT CALLED TO SCHEDULED FOLLOW UP WITH DR. CARDONA AS HE'S HAVING WHOOSHING, PULSATING SOUND IN LEFT EAR. STATED IT HAPPENED AFTER TAKING A SHOWER SO BELIEVES WATER IS BEHIND EAR DRUM BUT UNSURE.    HAS ANNUAL APPT WITH PCP IN October SO WILL ASK THEM AS 1ST AVAILABLE WITH DR. CARDONA WAS NOT UNTIL 11/16/2022.    WANTED TO KNOW ANY OVER THE COUNTER OPTIONS UNTIL CAN SEE EITHER PROVIDER.    PLEASE CALL.

## 2022-11-16 ENCOUNTER — OFFICE VISIT (OUTPATIENT)
Dept: OTOLARYNGOLOGY | Facility: CLINIC | Age: 57
End: 2022-11-16

## 2022-11-16 ENCOUNTER — PREP FOR SURGERY (OUTPATIENT)
Dept: OTHER | Facility: HOSPITAL | Age: 57
End: 2022-11-16

## 2022-11-16 VITALS — WEIGHT: 150.4 LBS | HEIGHT: 65 IN | BODY MASS INDEX: 25.06 KG/M2 | TEMPERATURE: 97.3 F

## 2022-11-16 DIAGNOSIS — H65.23 CHRONIC SEROUS OTITIS MEDIA OF BOTH EARS: ICD-10-CM

## 2022-11-16 DIAGNOSIS — H69.83 DYSFUNCTION OF BOTH EUSTACHIAN TUBES: ICD-10-CM

## 2022-11-16 DIAGNOSIS — H90.0 CONDUCTIVE HEARING LOSS OF BOTH EARS: Primary | ICD-10-CM

## 2022-11-16 DIAGNOSIS — H93.13 TINNITUS OF BOTH EARS: ICD-10-CM

## 2022-11-16 PROBLEM — H69.93 DYSFUNCTION OF BOTH EUSTACHIAN TUBES: Status: ACTIVE | Noted: 2022-11-16

## 2022-11-16 PROCEDURE — 92504 EAR MICROSCOPY EXAMINATION: CPT | Performed by: OTOLARYNGOLOGY

## 2022-11-16 PROCEDURE — 99214 OFFICE O/P EST MOD 30 MIN: CPT | Performed by: OTOLARYNGOLOGY

## 2022-11-16 RX ORDER — FAMOTIDINE 20 MG/1
20 TABLET, FILM COATED ORAL DAILY
COMMUNITY
Start: 2022-10-24 | End: 2022-12-28

## 2022-11-16 RX ORDER — NAPROXEN 500 MG/1
500 TABLET ORAL 2 TIMES DAILY WITH MEALS
COMMUNITY
Start: 2022-08-29

## 2022-11-16 NOTE — PROGRESS NOTES
Patient Name: Delgado Jerome   Visit Date: 11/16/2022   Patient ID: [id number]  Provider: Bryson Ayala MD    Sex: male  Location: [facility]   YOB: 1965  Location Address: [address] [place] [address], [address],?[place]?[address]    Primary Care Provider Marco A Thomas PA  Location Phone: [phone]    Referring Provider: No ref. provider found        Chief Complaint  Inner ear issues    History of Present Illness  Delgdao Jerome is a 57 y.o. male who presents to [facility] today for follow-up of bilateral conductive hearing loss, left ear pressure, and bilateral eustachian tube dysfunction.  He was originally seen on 2/15/2021 at which time he reported bilateral high-pitched tinnitus since childhood and nasal congestion for which he had been using Dymista, Singulair, fexofenadine, and saline irrigations with some improvement.  Examination that day revealed a slight left septal deviation and prominent maxillary crest bilaterally as well as inferior turbinate hypertrophy.  Audiogram on 4/5/2021 revealed right normal downsloping to mild to moderate conductive loss and left mild rising to normal downsloping to mild rising to normal downsloping to moderate conductive loss.  SRT was 10 on the right and 15 on the left.  Speech discrimination was 96% on the right and 90% on the left at 60 dB.  Tympanograms are type C bilaterally. Audiogram on 6/14/2021 revealed bilateral normal downsloping to mild to severe high-frequency sensorineural hearing loss on the right and mild high-frequency sensorineural hearing loss on the left.  SRT is 15 bilaterally.  Speech discrimination is 100% bilaterally at 50 dB.  Tympanograms are type B with expanded volumes.    He returns today for follow-up status post bilateral myringotomy and tube placement in clinic on 4/28/2021.  He was last seen on 6/14/2021 at which time both tubes were in place and patent. He tells  me that sometime in September he experienced pain in his left ear after getting water in the ear.  He feels as though his hearing has worsened slightly in the left ear and his tinnitus has changed.  He has now hearing crickets and the sound of water sloshing in that ear.  He initially tried some over-the-counter cerumen removal drops which hurt and therefore he stopped them.  He does report suffering from COVID in October.  He has not had any issues with otorrhea.      Past Medical History:   Diagnosis Date   • Anemia, unspecified    • Aneurysm ([facility])    • Arthritis    • Arthritis of carpometacarpal (CMC) joint of left thumb 11/15/2017   • Arthritis of carpometacarpal (CMC) joint of right thumb 01/10/2018   • Asthma allergy related   • Blood clotting disorder ([facility])    • Bunion    • Chest pain    • Chronic allergic rhinitis    • Conductive hearing loss, bilateral    • Corns and callus    • Deviated nasal septum    • Dizziness only occasionally   • Ear itching    • ETD (Eustachian tube dysfunction), bilateral    • Foot pain, 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   • GERD (gastroesophageal reflux disease)    • High cholesterol    • Hypertrophy of both inferior nasal turbinates    • Medial epicondylitis of elbow, left 11/14/2017   • Medial epicondylitis of elbow, right 01/10/2018   • Mood disorder ([facility])    • Haley neuroma, 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   • Nasal obstruction    • Numbness in both legs    • Pain, hand 11/15/2017   • Plantar warts    • Prostate cancer ([facility]) 05/15/2017   • Prostate cancer screening 07/25/2017   • Right elbow pain 01/10/2018   • Right wrist pain 01/10/2018   • Shortness of breath    • Tinnitus, bilateral    • Urgency of urination 05/15/2017       Past Surgical History:   Procedure Laterality Date   • ANKLE SURGERY     • ANKLE SURGERY     • APPENDECTOMY     • OTHER SURGICAL HISTORY      Metal Implant   • OTHER SURGICAL HISTORY      Artifical Joints/Limbs   • REPLACEMENT TOTAL KNEE      Right knee         Current Outpatient  Medications:   •  acetaminophen (TYLENOL) 500 MG tablet, Take 500 mg by mouth Daily., Disp: , Rfl:   •  Azelastine-Fluticasone 137-50 MCG/ACT suspension, azelastine-fluticasone 137-50 mcg/spray nasal spray,non-aerosol spray 1 spray in each nostril by intranasal route 2 times per day   Active, Disp: , Rfl:   •  busPIRone (BUSPAR) 15 MG tablet, buspirone 15 mg oral tablet take 0.5 tablet (7.5 mg) by oral route 2 times per day   Active, Disp: , Rfl:   •  diphenhydrAMINE-acetaminophen (Tylenol PM Extra Strength)  MG tablet per tablet, 1 tablet Every Night., Disp: , Rfl:   •  esomeprazole (nexIUM) 20 MG capsule, Take 20 mg by mouth Every Morning Before Breakfast., Disp: , Rfl:   •  famotidine (PEPCID) 20 MG tablet, Take 20 mg by mouth Daily., Disp: , Rfl:   •  finasteride (PROSCAR) 5 MG tablet, finasteride 5 mg oral tablet take 0.5 tablet by oral route daily   Active, Disp: , Rfl:   •  folic acid (FOLVITE) 1 MG tablet, Take 1,000 mcg by mouth Daily., Disp: , Rfl:   •  montelukast (SINGULAIR) 10 MG tablet, montelukast 10 mg oral tablet take 1 tablet (10 mg) by oral route once daily in the evening   Active, Disp: , Rfl:   •  naproxen (NAPROSYN) 500 MG tablet, Take 500 mg by mouth 2 (Two) Times a Day With Meals., Disp: , Rfl:   •  simvastatin (ZOCOR) 40 MG tablet, simvastatin 40 mg oral tablet take 1 tablet (40 mg) by oral route once daily in the evening   Active, Disp: , Rfl:   •  tolterodine LA (DETROL LA) 2 MG 24 hr capsule, Take 2 mg by mouth Daily., Disp: , Rfl:   •  vitamin D3 125 MCG (5000 UT) capsule capsule, Take 1 capsule by mouth Daily., Disp: , Rfl:   •  fexofenadine (ALLEGRA) 180 MG tablet, Take 180 mg by mouth Daily., Disp: , Rfl:      No Known Allergies    Social History     Tobacco Use   • Smoking status: Never   Substance Use Topics   • Alcohol use: Not Currently     Comment: Drinks weekly; beer   • Drug use: Never        Objective     Vital Signs:   Temp 97.3 °F (36.3 °C) (Temporal)   Ht 165.1 cm  "(65\")   Wt 68.2 kg (150 lb 6.4 oz)   BMI 25.03 kg/m²       Physical Exam    General: Well developed, well nourished patient of stated age in no acute distress. Voice is strong and clear.   Head: Normocephalic and atraumatic.   Face: No lesions. House-Brackmann I/VI bilaterally.   muscles and temporomandibular joint nontender to palpation.  No TMJ crepitus.  Eyes: PERRLA, sclerae anicteric, no conjunctival injection. Extra ocular movements are intact and full. No nystagmus.   Ears: Auricles are normal in appearance. Bilateral external auditory canals are unremarkable.  Left tympanic membrane with extruded tube abutting the anterior portion of the tympanic membrane.  This was removed using the operating microscope and alligator forceps revealing a slightly retracted tympanic membrane with serous effusion.  Right tympanic membrane with pressure equalization tube in the process of extruding.  No otorrhea.  Hearing normal to conversational voice.   Nose: External nose is normal in appearance. Bilateral nares are patent with normal appearing mucosa. Septum deviated to the left. Turbinates are  hypertrophied. No lesions.   Oral Cavity: Lips are normal in appearance. Oral mucosa is unremarkable. Gingiva is unremarkable. Normal dentition for age. Tongue is unremarkable with good movement. Hard palate is unremarkable.   Oropharynx: Soft palate is unremarkable with full movement. Uvula is unremarkable. Bilateral tonsils are unremarkable. Posterior oropharynx is unremarkable.    Larynx and hypopharynx: Deferred secondary to gag reflex.  Neck: Supple.  No mass.  Nontender to palpation.  Trachea midline. Thyroid normal size and without nodules to palpation.   Lymphatic: No lymphadenopathy upon palpation.   Psychiatric: Appropriate affect, cooperative   Neurologic: Oriented x 3, strength symmetric in all extremities, Cranial Nerves II-XII are grossly intact to confrontation   Skin: Warm and dry. No " forrest.    Procedures     Result Review :               Assessment and Plan    Diagnoses and all orders for this visit:   Diagnosis Plan   1. Conductive hearing loss of both ears        2. Dysfunction of both eustachian tubes        3. Tinnitus of both ears        4. Chronic serous otitis media of both ears          Impressions and findings were discussed at great length.  Currently, he is seen for evaluation of a change in his left-sided tinnitus and worsening left-sided hearing.  Examination today reveals an extruded tube to be abutting the left anterior tympanic membrane.  This was removed in clinic using the operating microscope and alligator forceps revealing a slightly retracted tympanic membrane with serous effusion.  His right tympanostomy tube is in the process of extruding.  We discussed the pathophysiology and natural history of his condition.  We reviewed and discussed his pretube and post tube audiograms.  Options for management including continued medical management versus myringotomy and tube placement bilaterally was discussed. The risks, benefits, and alternatives were discussed. The risks include persistent drainage from the tubes occasionally requiring removal, blockage of the tubes by drainage, early displacement of the tubes, and tympanic membrane perforation.  After a thorough discussion he has elected to pursue bilateral myringotomy and T-tube placement.  This will be performed at his earliest convenience.        Follow Up   No follow-ups on file.  Patient was given instructions and counseling regarding his condition or for health maintenance advice. Please see specific information pulled into the AVS if appropriate.

## 2022-12-28 RX ORDER — GABAPENTIN 300 MG/1
600 CAPSULE ORAL 2 TIMES DAILY
COMMUNITY

## 2022-12-28 RX ORDER — FEXOFENADINE HCL 180 MG/1
180 TABLET ORAL DAILY
COMMUNITY

## 2022-12-28 NOTE — PRE-PROCEDURE INSTRUCTIONS
Patient instructed to have no food past midnight, clears up to 2 hours prior to arrival time. Patient instructed to wear no lotions, jewelry or piercing's day of surgery.  Patient to hold vitamins starting today. Prescription meds able to take am of surgery.

## 2023-01-03 ENCOUNTER — ANESTHESIA EVENT (OUTPATIENT)
Dept: PERIOP | Facility: HOSPITAL | Age: 58
End: 2023-01-03
Payer: COMMERCIAL

## 2023-01-03 ENCOUNTER — ANESTHESIA (OUTPATIENT)
Dept: PERIOP | Facility: HOSPITAL | Age: 58
End: 2023-01-03
Payer: COMMERCIAL

## 2023-01-03 ENCOUNTER — HOSPITAL ENCOUNTER (OUTPATIENT)
Facility: HOSPITAL | Age: 58
Setting detail: HOSPITAL OUTPATIENT SURGERY
Discharge: HOME | End: 2023-01-03
Attending: OTOLARYNGOLOGY | Admitting: OTOLARYNGOLOGY
Payer: COMMERCIAL

## 2023-01-03 VITALS
OXYGEN SATURATION: 97 % | DIASTOLIC BLOOD PRESSURE: 87 MMHG | SYSTOLIC BLOOD PRESSURE: 118 MMHG | BODY MASS INDEX: 25.31 KG/M2 | HEART RATE: 65 BPM | HEIGHT: 65 IN | WEIGHT: 151.9 LBS | TEMPERATURE: 96.4 F | RESPIRATION RATE: 16 BRPM

## 2023-01-03 PROCEDURE — C1889 IMPLANT/INSERT DEVICE, NOC: HCPCS | Performed by: OTOLARYNGOLOGY

## 2023-01-03 PROCEDURE — 25010000002 PROPOFOL 10 MG/ML EMULSION: Performed by: NURSE ANESTHETIST, CERTIFIED REGISTERED

## 2023-01-03 PROCEDURE — 25010000002 DEXAMETHASONE PER 1 MG: Performed by: NURSE ANESTHETIST, CERTIFIED REGISTERED

## 2023-01-03 PROCEDURE — 25010000002 MIDAZOLAM PER 1 MG: Performed by: ANESTHESIOLOGY

## 2023-01-03 PROCEDURE — 25010000002 FENTANYL CITRATE (PF) 50 MCG/ML SOLUTION: Performed by: NURSE ANESTHETIST, CERTIFIED REGISTERED

## 2023-01-03 PROCEDURE — 69436 CREATE EARDRUM OPENING: CPT | Performed by: OTOLARYNGOLOGY

## 2023-01-03 PROCEDURE — 25010000002 ONDANSETRON PER 1 MG: Performed by: NURSE ANESTHETIST, CERTIFIED REGISTERED

## 2023-01-03 DEVICE — IMPLANTABLE DEVICE: Type: IMPLANTABLE DEVICE | Site: TYMPANIC MEMBRANE | Status: FUNCTIONAL

## 2023-01-03 RX ORDER — DEXAMETHASONE SODIUM PHOSPHATE 4 MG/ML
INJECTION, SOLUTION INTRA-ARTICULAR; INTRALESIONAL; INTRAMUSCULAR; INTRAVENOUS; SOFT TISSUE AS NEEDED
Status: DISCONTINUED | OUTPATIENT
Start: 2023-01-03 | End: 2023-01-03 | Stop reason: SURG

## 2023-01-03 RX ORDER — FENTANYL CITRATE 50 UG/ML
INJECTION, SOLUTION INTRAMUSCULAR; INTRAVENOUS AS NEEDED
Status: DISCONTINUED | OUTPATIENT
Start: 2023-01-03 | End: 2023-01-03 | Stop reason: SURG

## 2023-01-03 RX ORDER — OXYCODONE HYDROCHLORIDE 5 MG/1
5 TABLET ORAL
Status: DISCONTINUED | OUTPATIENT
Start: 2023-01-03 | End: 2023-01-03 | Stop reason: HOSPADM

## 2023-01-03 RX ORDER — ACETAMINOPHEN 500 MG
1000 TABLET ORAL ONCE
Status: COMPLETED | OUTPATIENT
Start: 2023-01-03 | End: 2023-01-03

## 2023-01-03 RX ORDER — ONDANSETRON 2 MG/ML
4 INJECTION INTRAMUSCULAR; INTRAVENOUS ONCE AS NEEDED
Status: DISCONTINUED | OUTPATIENT
Start: 2023-01-03 | End: 2023-01-03 | Stop reason: HOSPADM

## 2023-01-03 RX ORDER — MIDAZOLAM HYDROCHLORIDE 1 MG/ML
2 INJECTION INTRAMUSCULAR; INTRAVENOUS ONCE
Status: COMPLETED | OUTPATIENT
Start: 2023-01-03 | End: 2023-01-03

## 2023-01-03 RX ORDER — PROMETHAZINE HYDROCHLORIDE 25 MG/1
25 SUPPOSITORY RECTAL ONCE AS NEEDED
Status: DISCONTINUED | OUTPATIENT
Start: 2023-01-03 | End: 2023-01-03 | Stop reason: HOSPADM

## 2023-01-03 RX ORDER — PROPOFOL 10 MG/ML
VIAL (ML) INTRAVENOUS AS NEEDED
Status: DISCONTINUED | OUTPATIENT
Start: 2023-01-03 | End: 2023-01-03 | Stop reason: SURG

## 2023-01-03 RX ORDER — ONDANSETRON 2 MG/ML
INJECTION INTRAMUSCULAR; INTRAVENOUS AS NEEDED
Status: DISCONTINUED | OUTPATIENT
Start: 2023-01-03 | End: 2023-01-03 | Stop reason: SURG

## 2023-01-03 RX ORDER — SCOLOPAMINE TRANSDERMAL SYSTEM 1 MG/1
1 PATCH, EXTENDED RELEASE TRANSDERMAL ONCE
Status: DISCONTINUED | OUTPATIENT
Start: 2023-01-03 | End: 2023-01-03 | Stop reason: HOSPADM

## 2023-01-03 RX ORDER — MEPERIDINE HYDROCHLORIDE 25 MG/ML
12.5 INJECTION INTRAMUSCULAR; INTRAVENOUS; SUBCUTANEOUS
Status: DISCONTINUED | OUTPATIENT
Start: 2023-01-03 | End: 2023-01-03 | Stop reason: HOSPADM

## 2023-01-03 RX ORDER — OFLOXACIN 3 MG/ML
SOLUTION/ DROPS OPHTHALMIC AS NEEDED
Status: DISCONTINUED | OUTPATIENT
Start: 2023-01-03 | End: 2023-01-03 | Stop reason: HOSPADM

## 2023-01-03 RX ORDER — SODIUM CHLORIDE, SODIUM LACTATE, POTASSIUM CHLORIDE, CALCIUM CHLORIDE 600; 310; 30; 20 MG/100ML; MG/100ML; MG/100ML; MG/100ML
9 INJECTION, SOLUTION INTRAVENOUS CONTINUOUS PRN
Status: DISCONTINUED | OUTPATIENT
Start: 2023-01-03 | End: 2023-01-03 | Stop reason: HOSPADM

## 2023-01-03 RX ORDER — LIDOCAINE HYDROCHLORIDE 20 MG/ML
INJECTION, SOLUTION EPIDURAL; INFILTRATION; INTRACAUDAL; PERINEURAL AS NEEDED
Status: DISCONTINUED | OUTPATIENT
Start: 2023-01-03 | End: 2023-01-03 | Stop reason: SURG

## 2023-01-03 RX ORDER — PROMETHAZINE HYDROCHLORIDE 12.5 MG/1
25 TABLET ORAL ONCE AS NEEDED
Status: DISCONTINUED | OUTPATIENT
Start: 2023-01-03 | End: 2023-01-03 | Stop reason: HOSPADM

## 2023-01-03 RX ADMIN — SCOPALAMINE 1 PATCH: 1 PATCH, EXTENDED RELEASE TRANSDERMAL at 08:03

## 2023-01-03 RX ADMIN — LIDOCAINE HYDROCHLORIDE 100 MG: 20 INJECTION, SOLUTION EPIDURAL; INFILTRATION; INTRACAUDAL; PERINEURAL at 08:55

## 2023-01-03 RX ADMIN — SODIUM CHLORIDE, POTASSIUM CHLORIDE, SODIUM LACTATE AND CALCIUM CHLORIDE 9 ML/HR: 600; 310; 30; 20 INJECTION, SOLUTION INTRAVENOUS at 08:05

## 2023-01-03 RX ADMIN — PROPOFOL 150 MG: 10 INJECTION, EMULSION INTRAVENOUS at 08:55

## 2023-01-03 RX ADMIN — ONDANSETRON 4 MG: 2 INJECTION INTRAMUSCULAR; INTRAVENOUS at 09:01

## 2023-01-03 RX ADMIN — ACETAMINOPHEN 1000 MG: 500 TABLET ORAL at 08:03

## 2023-01-03 RX ADMIN — MIDAZOLAM HYDROCHLORIDE 2 MG: 2 INJECTION, SOLUTION INTRAMUSCULAR; INTRAVENOUS at 08:13

## 2023-01-03 RX ADMIN — DEXAMETHASONE SODIUM PHOSPHATE 4 MG: 4 INJECTION INTRA-ARTICULAR; INTRALESIONAL; INTRAMUSCULAR; INTRAVENOUS; SOFT TISSUE at 09:01

## 2023-01-03 RX ADMIN — FENTANYL CITRATE 25 MCG: 50 INJECTION, SOLUTION INTRAMUSCULAR; INTRAVENOUS at 08:55

## 2023-01-03 NOTE — OP NOTE
MYRINGOTOMY WITH INSERTION OF EAR TUBES  Procedure Report    Patient Name:  Delgado Jerome  YOB: 1965    Date of Surgery:  1/3/2023     Indications:      Pre-op Diagnosis:   Conductive hearing loss of both ears [H90.0]  Dysfunction of both eustachian tubes [H69.83]       Post-Op Diagnosis Codes:     * Conductive hearing loss of both ears [H90.0]     * Dysfunction of both eustachian tubes [H69.83]    Procedure/CPT® Codes:      Procedure(s):  MYRINGOTOMY WITH INSERTION OF EAR TUBES    Staff:  Surgeon(s):  Bryson Ayala MD         Anesthesia: Choice    Estimated Blood Loss: 0 mL    Implants:    Implant Name Type Inv. Item Serial No.  Lot No. LRB No. Used Action   TBG T EAR VNT RICHRDS SFT/SILCNE TJ - [id number] Implant TBG T EAR VNT RICHRDS SFT/SILCNE TJ  [facility] [id number]  1 Implanted       Specimen:          None        Findings: Bilateral mildly retracted tympanic membranes with partial serous effusions    Complications: None    Description of Procedure:   The patient was brought back to the operating room and placed supine upon the table. General mask anesthesia was successfully established and the patient was prepped and draped in the usual manner for bilateral myringotomy and tube placement. The operating microscope was moved into position to view the patient's right ear. A radial myringotomy was performed in the anteroinferior quadrant revealing a scant effusion. This was suctioned and a pressure Barron T-tube placed without difficulty. A similar procedure was then performed on the patient's left ear again revealing a scant serous effusion. Floxin drops were instilled into the bilateral ear canals. The patient was then returned to the care of anesthesia. The patient tolerated the procedure well and was transferred to the recovery room in satisfactory condition and without apparent complication.          Bryson Ayala MD     Date: 1/3/2023  Time: 09:43  EST

## 2023-01-03 NOTE — H&P
[facility]   HISTORY AND PHYSICAL    Patient Name: Delgado Jerome  : 1965  MRN: [id number]  Primary Care Physician:  Marco A Thomas PA  Date of admission: 1/3/2023    Subjective   Subjective     Chief Complaint: \"I am ready\"    HPI:    Delgado Jerome is a 57 y.o. male who presents today to undergo bilateral myringotomy and T-tube placement secondary to eustachian tube dysfunction, chronic serous otitis media, and conductive hearing loss.  He was last seen on 2022 please see that note for further details.  He tells me that he has done well since last appointment aside from some issues with rhinorrhea and nasal congestion.    Review of Systems   The following systems were reviewed and negative;  constitution, eyes, ENT, respiratory, cardiovascular, gastrointestinal, genitourinary, integument, hematologic / lymphatic, musculoskeletal, neurological, behavioral/psych, endocrine, and allergies / immunologic.    Personal History     Past Medical History:   Diagnosis Date   • Anemia, unspecified    • Arthritis    • Arthritis of carpometacarpal (CMC) joint of left thumb 11/15/2017   • Arthritis of carpometacarpal (CMC) joint of right thumb 01/10/2018   • Blood clot in vein     left leg   • Chest pain    • Chronic allergic rhinitis    • Conductive hearing loss, bilateral    • Deviated nasal septum    • ETD (Eustachian tube dysfunction), bilateral    • Foot pain, left 2019   • GERD (gastroesophageal reflux disease)    • High cholesterol    • Hypertrophy of both inferior nasal turbinates    • Medial epicondylitis of elbow, left 2017   • Medial epicondylitis of elbow, right 01/10/2018   • Mood disorder (HCC)    • Haley neuroma, right 2019   • Nasal obstruction    • Numbness in both legs    • Shortness of breath    • Tinnitus    • Tinnitus, bilateral    • Urgency of urination 05/15/2017       Past Surgical History:   Procedure Laterality Date   • ANKLE SURGERY     • APPENDECTOMY     •  EAR TUBES     • KNEE MENISCAL REPAIR     • REPLACEMENT TOTAL KNEE      Right knee       Family History: family history includes Cancer in his father; Heart disease in his paternal grandmother; Pancreatic cancer in his father; Stroke in his maternal grandmother. Otherwise pertinent FHx was reviewed and not pertinent to current issue.    Social History:  reports that he has never smoked. He has never used smokeless tobacco. He reports that he does not currently use alcohol. He reports that he does not use drugs.    Home Medications:  Azelastine-Fluticasone, acetaminophen, busPIRone, diphenhydrAMINE-acetaminophen, esomeprazole, fexofenadine, finasteride, folic acid, gabapentin, montelukast, naproxen, simvastatin, tolterodine LA, and vitamin D3      Allergies:  No Known Allergies    Objective   Objective     Vitals:   Temp:  [98.6 °F (37 °C)] 98.6 °F (37 °C)  Heart Rate:  [68] 68  Resp:  [20] 20  BP: (135)/(92) 135/92  Physical Exam   General: Well developed, well nourished patient of stated age in no acute distress. Voice is strong and clear.    Respiratory: Clear to auscultation bilaterally, nonlabored respirations    Cardiovascular: RRR, no murmurs, rubs, or gallops, palpable pedal pulses bilaterally   Psychiatric: Appropriate affect, cooperative       Result Review    Result Review:  I have personally reviewed the results from the time of this admission to 1/3/2023 07:54 EST and agree with these findings:  []  Laboratory  []  Microbiology  []  Radiology  []  EKG/Telemetry   []  Cardiology/Vascular   []  Pathology  []  Old records  []  Other    Assessment & Plan   Assessment / Plan     Brief Patient Summary:  Delgado Jerome is a 57 y.o. male who presents today to undergo bilateral myringotomy and T-tube placement secondary to eustachian tube dysfunction, chronic serous otitis media, and conductive hearing loss.  We discussed the risks, benefits, alternatives, and expected postoperative course.  After a thorough  discussion he elected to proceed with surgery.    Active Hospital Problems:  Active Hospital Problems    Diagnosis    • Conductive hearing loss of both ears    • Dysfunction of both eustachian tubes        Plan: Proceed with surgery      CODE STATUS:         Electronically signed by Bryson Ayala MD, 01/03/23, 7:54 AM EST.

## 2023-01-03 NOTE — DISCHARGE INSTRUCTIONS
DISCHARGE INSTRUCTIONS   EAR TUBES      For your surgery you had:  General anesthesia (you may have a sore throat for the first 24 hours)  IV sedation  Monitored anesthesia care  You received a medicated patch for nausea prevention today (behind your ear). It is recommended that you remove it 24-48 hours post-operatively. It must be removed within 72 hours.  You have received an anesthesia medication today that can cause hormonal forms of birth control to be ineffective. You should use a different form of birth control (to prevent pregnancy) for 7 days.   You may experience dizziness, drowsiness, or lightheadedness for several hours following surgery.  Do not stay alone today or tonight.  Limit your activity for 24 hours.  You should not drive, operate machinery, drink alcohol, or sign legally binding documents for 24 hours or while you are taking pain medication.  Resume your diet slowly.  Follow any special dietary instructions you may have been given by your doctor.  Last dose of pain medication was given at:  .  NOTIFY YOUR DOCTOR IF YOU EXPERIENCE ANY OF THE FOLLOWING:  Temperature greater than 101 degrees Fahrenheit  Shaking Chills  Redness or excessive drainage from incision  Nausea, vomiting and/or pain that is not controlled by prescribed medications  Increase in bleeding or bleeding that is excessive  Unable to urinate in 6 hours after surgery  If unable to reach your doctor, please go to the closest Emergency Room Keep the child's ear dry.  You may place a cotton ball dipped in vaseline into the outer ear while bathing or shampooing hair.  A small amount of bloody drainage from the ear is normal for the first 24-48 hours after surgery.  Notify your doctor if the drainage looks like pus.  Swimming or diving only with the doctor's permission.  No nose blowing for the first 7 - 10 days.  Medications per physician instructions as indicated on Discharge Medication Information Sheet.  You should see Dr. hogan  follow-up care  on   .  Phone number:      SPECIAL INSTRUCTIONS:

## 2023-01-03 NOTE — ANESTHESIA POSTPROCEDURE EVALUATION
Patient: Delgado Jerome    Procedure Summary     Date: 01/03/23 Room / Location: [facility] OR 1 / [facility] OR [facility]    Anesthesia Start: 0852 Anesthesia Stop: 0920    Procedure: MYRINGOTOMY WITH INSERTION OF EAR TUBES (Bilateral: Ear) Diagnosis:       Conductive hearing loss of both ears      Dysfunction of both eustachian tubes      (Conductive hearing loss of both ears [H90.0])      (Dysfunction of both eustachian tubes [H69.83])    Surgeons: Bryson Ayala MD Provider: Harshad Dewitt MD    Anesthesia Type: general ASA Status: 2          Anesthesia Type: general    Vitals  Vitals Value Taken Time   /85 01/03/23 0940   Temp 36 °C (96.8 °F) 01/03/23 0920   Pulse 70 01/03/23 0940   Resp 12 01/03/23 0940   SpO2 97 % 01/03/23 0940           Post Anesthesia Care and Evaluation    Patient location during evaluation: bedside  Patient participation: complete - patient participated  Level of consciousness: awake  Pain management: adequate    Airway patency: patent  Anesthetic complications: No anesthetic complications  PONV Status: none  Cardiovascular status: acceptable and stable  Respiratory status: acceptable  Hydration status: acceptable    Comments: An Anesthesiologist personally participated in the most demanding procedures (including induction and emergence if applicable) in the anesthesia plan, monitored the course of anesthesia administration at frequent intervals and remained physically present and available for immediate diagnosis and treatment of emergencies.

## 2023-01-03 NOTE — ANESTHESIA PREPROCEDURE EVALUATION
Anesthesia Evaluation     Patient summary reviewed and Nursing notes reviewed   history of anesthetic complications: PONV  NPO Solid Status: > 8 hours  NPO Liquid Status: > 2 hours           Airway   Mallampati: II  TM distance: >3 FB  Neck ROM: full  No difficulty expected  Dental      Pulmonary - negative pulmonary ROS and normal exam    breath sounds clear to auscultation  Cardiovascular - negative cardio ROS and normal exam  Exercise tolerance: good (4-7 METS)    Rhythm: regular  Rate: normal        Neuro/Psych- negative ROS  GI/Hepatic/Renal/Endo    (+)  GERD,      Musculoskeletal (-) negative ROS    Abdominal    Substance History - negative use     OB/GYN negative ob/gyn ROS         Other - negative ROS       ROS/Med Hx Other: PAT Nursing Notes unavailable.                   Anesthesia Plan    ASA 2     general       Anesthetic plan, risks, benefits, and alternatives have been provided, discussed and informed consent has been obtained with: patient and spouse/significant other.        CODE STATUS:

## 2023-01-11 ENCOUNTER — OFFICE VISIT (OUTPATIENT)
Dept: OTOLARYNGOLOGY | Facility: CLINIC | Age: 58
End: 2023-01-11
Payer: COMMERCIAL

## 2023-01-11 VITALS — WEIGHT: 151.2 LBS | BODY MASS INDEX: 25.19 KG/M2 | TEMPERATURE: 97.5 F | HEIGHT: 65 IN

## 2023-01-11 DIAGNOSIS — H65.23 CHRONIC SEROUS OTITIS MEDIA OF BOTH EARS: ICD-10-CM

## 2023-01-11 DIAGNOSIS — H90.3 SENSORY HEARING LOSS, BILATERAL: ICD-10-CM

## 2023-01-11 DIAGNOSIS — H69.83 DYSFUNCTION OF BOTH EUSTACHIAN TUBES: Primary | ICD-10-CM

## 2023-01-11 PROCEDURE — 99024 POSTOP FOLLOW-UP VISIT: CPT | Performed by: OTOLARYNGOLOGY

## 2023-01-11 RX ORDER — IPRATROPIUM BROMIDE 42 UG/1
SPRAY, METERED NASAL
COMMUNITY
Start: 2022-12-21

## 2023-01-11 NOTE — PROGRESS NOTES
Patient Name: Delgado Jerome   Visit Date: 01/11/2023   Patient ID: [id number]  Provider: Bryson Ayala MD    Sex: male  Location: [facility]   YOB: 1965  Location Address: [address] [place] [address], [address],?[place]?[address]    Primary Care Provider Marco A Thomas PA  Location Phone: [phone]    Referring Provider: No ref. provider found        Chief Complaint  Ear Fullness    History of Present Illness  Delgado Jerome is a 57 y.o. male who presents to [facility] today for follow-up of bilateral conductive hearing loss, left ear pressure, and bilateral eustachian tube dysfunction.  He was originally seen on 2/15/2021 at which time he reported bilateral high-pitched tinnitus since childhood and nasal congestion for which he had been using Dymista, Singulair, fexofenadine, and saline irrigations with some improvement.  Examination that day revealed a slight left septal deviation and prominent maxillary crest bilaterally as well as inferior turbinate hypertrophy.  Audiogram on 4/5/2021 revealed right normal downsloping to mild to moderate conductive loss and left mild rising to normal downsloping to mild rising to normal downsloping to moderate conductive loss.  SRT was 10 on the right and 15 on the left.  Speech discrimination was 96% on the right and 90% on the left at 60 dB.  Tympanograms are type C bilaterally. Audiogram on 6/14/2021 revealed bilateral normal downsloping to mild to severe high-frequency sensorineural hearing loss on the right and mild high-frequency sensorineural hearing loss on the left.  SRT is 15 bilaterally.  Speech discrimination is 100% bilaterally at 50 dB.  Tympanograms are type B with expanded volumes.    He returns today for follow-up status post bilateral myringotomy and tube placement in clinic on 4/28/2021 along with bilateral myringotomy and T-tube placement on 1/3/2023.  He tells me that since  surgery his right ear has felt fine but the left ear feels full and warm.  He has been experiencing clear rhinorrhea but is otherwise doing well.    Past Medical History:   Diagnosis Date   • Anemia, unspecified    • Arthritis    • Arthritis of carpometacarpal (CMC) joint of left thumb 11/15/2017   • Arthritis of carpometacarpal (CMC) joint of right thumb 01/10/2018   • Blood clot in vein     left leg   • Chest pain    • Chronic allergic rhinitis    • Conductive hearing loss, bilateral    • Deviated nasal septum    • ETD (Eustachian tube dysfunction), bilateral    • Foot pain, left 03/28/2019   • GERD (gastroesophageal reflux disease)    • High cholesterol    • Hypertrophy of both inferior nasal turbinates    • Medial epicondylitis of elbow, left 11/14/2017   • Medial epicondylitis of elbow, right 01/10/2018   • Mood disorder (HCC)    • Haley neuroma, 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   • Nasal obstruction    • Numbness in both legs    • Shortness of breath    • Tinnitus    • Tinnitus, bilateral    • Urgency of urination 05/15/2017       Past Surgical History:   Procedure Laterality Date   • ANKLE SURGERY     • APPENDECTOMY     • EAR TUBES     • KNEE MENISCAL REPAIR     • MYRINGOTOMY W/ TUBES Bilateral 1/3/2023    Procedure: MYRINGOTOMY WITH INSERTION OF EAR TUBES;  Surgeon: Bryson Ayala MD;  [facility]: [facility];  Service: ENT;  Laterality: Bilateral;   • REPLACEMENT TOTAL KNEE      Right knee         Current Outpatient Medications:   •  acetaminophen (TYLENOL) 500 MG tablet, Take 500 mg by mouth Daily., Disp: , Rfl:   •  Azelastine-Fluticasone 137-50 MCG/ACT suspension, azelastine-fluticasone 137-50 mcg/spray nasal spray,non-aerosol spray 1 spray in each nostril by intranasal route 2 times per day   Active, Disp: , Rfl:   •  busPIRone (BUSPAR) 15 MG tablet, buspirone 15 mg oral tablet take 0.5 tablet (7.5 mg) by oral route 2 times per day   Active, Disp: , Rfl:   •  diphenhydrAMINE-acetaminophen (Tylenol PM Extra  "Strength)  MG tablet per tablet, 1 tablet Every Night., Disp: , Rfl:   •  esomeprazole (nexIUM) 20 MG capsule, Take 20 mg by mouth Every Morning Before Breakfast., Disp: , Rfl:   •  fexofenadine (ALLEGRA) 180 MG tablet, Take 180 mg by mouth Daily., Disp: , Rfl:   •  finasteride (PROSCAR) 5 MG tablet, finasteride 5 mg oral tablet take 0.5 tablet by oral route daily   Active, Disp: , Rfl:   •  folic acid (FOLVITE) 1 MG tablet, Take 1,000 mcg by mouth Daily., Disp: , Rfl:   •  gabapentin (NEURONTIN) 300 MG capsule, Take 600 mg by mouth 2 (Two) Times a Day., Disp: , Rfl:   •  ipratropium (ATROVENT) 0.06 % nasal spray, USE 2 SPRAYS IN EACH NOSTRIL EVERY 6 HOURS AS NEEDED, Disp: , Rfl:   •  montelukast (SINGULAIR) 10 MG tablet, montelukast 10 mg oral tablet take 1 tablet (10 mg) by oral route once daily in the evening   Active, Disp: , Rfl:   •  naproxen (NAPROSYN) 500 MG tablet, Take 500 mg by mouth 2 (Two) Times a Day With Meals., Disp: , Rfl:   •  simvastatin (ZOCOR) 40 MG tablet, simvastatin 40 mg oral tablet take 1 tablet (40 mg) by oral route once daily in the evening   Active, Disp: , Rfl:   •  tolterodine LA (DETROL LA) 2 MG 24 hr capsule, Take 2 mg by mouth Daily., Disp: , Rfl:   •  vitamin D3 125 MCG (5000 UT) capsule capsule, Take 1 capsule by mouth Daily., Disp: , Rfl:      No Known Allergies    Social History     Tobacco Use   • Smoking status: Never   • Smokeless tobacco: Never   Vaping Use   • Vaping Use: Never used   Substance Use Topics   • Alcohol use: Not Currently   • Drug use: Never        Objective     Vital Signs:   Temp 97.5 °F (36.4 °C) (Temporal)   Ht 165.1 cm (65\")   Wt 68.6 kg (151 lb 3.2 oz)   BMI 25.16 kg/m²       Physical Exam    General: Well developed, well nourished patient of stated age in no acute distress. Voice is strong and clear.   Head: Normocephalic and atraumatic.   Face: No lesions. House-Brackmann I/VI bilaterally.   muscles and temporomandibular joint " nontender to palpation.  No TMJ crepitus.  Eyes: PERRLA, sclerae anicteric, no conjunctival injection. Extra ocular movements are intact and full. No nystagmus.   Ears: Auricles are normal in appearance. Bilateral external auditory canals are unremarkable.  Right tympanic membrane with T-tube in place and patent.  Left tympanic membrane with T-tube in place but angled slightly anteriorly and inferiorly.  This was repositioned using the operating microscope and a straight pick.  No otorrhea.  Hearing normal to conversational voice.   Psychiatric: Appropriate affect, cooperative     Procedures     Result Review :               Assessment and Plan    Diagnoses and all orders for this visit:   Diagnosis Plan   1. Dysfunction of both eustachian tubes        2. Chronic serous otitis media of both ears        3. Sensory hearing loss, bilateral          Impressions and findings were discussed at great length.  Currently, both tubes are in place and patent but the left T-tube was angled and in contact with the canal.  This was repositioned in clinic.  He was given ample time to ask questions, all of which were answered to satisfaction.  He may follow-up as previously scheduled.      Follow Up   No follow-ups on file.  Patient was given instructions and counseling regarding his condition or for health maintenance advice. Please see specific information pulled into the AVS if appropriate.

## 2023-02-15 ENCOUNTER — OFFICE VISIT (OUTPATIENT)
Dept: OTOLARYNGOLOGY | Facility: CLINIC | Age: 58
End: 2023-02-15
Payer: COMMERCIAL

## 2023-02-15 VITALS — HEIGHT: 65 IN | TEMPERATURE: 97.1 F | WEIGHT: 149.4 LBS | BODY MASS INDEX: 24.89 KG/M2

## 2023-02-15 DIAGNOSIS — H93.13 TINNITUS OF BOTH EARS: ICD-10-CM

## 2023-02-15 DIAGNOSIS — H90.3 SENSORY HEARING LOSS, BILATERAL: Primary | ICD-10-CM

## 2023-02-15 DIAGNOSIS — H69.83 DYSFUNCTION OF BOTH EUSTACHIAN TUBES: ICD-10-CM

## 2023-02-15 PROCEDURE — 99024 POSTOP FOLLOW-UP VISIT: CPT | Performed by: OTOLARYNGOLOGY

## 2023-02-15 NOTE — PROGRESS NOTES
Patient Name: Delgado Jerome   Visit Date: 02/15/2023   Patient ID: [id number]  Provider: Bryson Ayala MD    Sex: male  Location: [facility]   YOB: 1965  Location Address: [address] [place] [address], [address],?[place]?[address]    Primary Care Provider Marco A Thomas PA  Location Phone: [phone]    Referring Provider: No ref. provider found        Chief Complaint  6 week post op    History of Present Illness  Delgado Jerome is a 57 y.o. male who presents to [facility] today for follow-up of bilateral conductive hearing loss, left ear pressure, and bilateral eustachian tube dysfunction.  He was originally seen on 2/15/2021 at which time he reported bilateral high-pitched tinnitus since childhood and nasal congestion for which he had been using Dymista, Singulair, fexofenadine, and saline irrigations with some improvement.  Examination that day revealed a slight left septal deviation and prominent maxillary crest bilaterally as well as inferior turbinate hypertrophy.  Audiogram on 4/5/2021 revealed right normal downsloping to mild to moderate conductive loss and left mild rising to normal downsloping to mild rising to normal downsloping to moderate conductive loss.  SRT was 10 on the right and 15 on the left.  Speech discrimination was 96% on the right and 90% on the left at 60 dB.  Tympanograms are type C bilaterally. Audiogram on 6/14/2021 revealed bilateral normal downsloping to mild to severe high-frequency sensorineural hearing loss on the right and mild high-frequency sensorineural hearing loss on the left.  SRT is 15 bilaterally.  Speech discrimination is 100% bilaterally at 50 dB.  Tympanograms are type B with expanded volumes.    He returns today for follow-up status post bilateral myringotomy and tube placement in clinic on 4/28/2021 along with bilateral myringotomy and T-tube placement on 1/3/2023.  He was last seen on  1/11/2023 at which time he reported that his left ear felt full and warm.  His left T-tube was repositioned off of the canal. He tells me that he is constantly aware of his left ear. He mentions fuzzy, heavy, and full sensations. He denies any pulsatile sensation. He mentions that his tinnitus has worsened recently. He feels like his hearing is stable but he remains frustrated by difficulty with understanding or hearing certain pitches.  It is affecting his job performance.    Past Medical History:   Diagnosis Date   • Anemia, unspecified    • Arthritis    • Arthritis of carpometacarpal (CMC) joint of left thumb 11/15/2017   • Arthritis of carpometacarpal (CMC) joint of right thumb 01/10/2018   • Blood clot in vein     left leg   • Chest pain    • Chronic allergic rhinitis    • Conductive hearing loss, bilateral    • Deviated nasal septum    • ETD (Eustachian tube dysfunction), bilateral    • Foot pain, left 03/28/2019   • GERD (gastroesophageal reflux disease)    • High cholesterol    • Hypertrophy of both inferior nasal turbinates    • Medial epicondylitis of elbow, left 11/14/2017   • Medial epicondylitis of elbow, right 01/10/2018   • Mood disorder (HCC)    • Haley neuroma, 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   • Nasal obstruction    • Numbness in both legs    • Shortness of breath    • Tinnitus    • Tinnitus, bilateral    • Urgency of urination 05/15/2017       Past Surgical History:   Procedure Laterality Date   • ANKLE SURGERY     • APPENDECTOMY     • EAR TUBES     • KNEE MENISCAL REPAIR     • MYRINGOTOMY W/ TUBES Bilateral 1/3/2023    Procedure: MYRINGOTOMY WITH INSERTION OF EAR TUBES;  Surgeon: Bryson Ayala MD;  [facility];  Service: ENT;  Laterality: Bilateral;   • REPLACEMENT TOTAL KNEE      Right knee         Current Outpatient Medications:   •  acetaminophen (TYLENOL) 500 MG tablet, Take 500 mg by mouth Daily., Disp: , Rfl:   •  Azelastine-Fluticasone 137-50 MCG/ACT suspension,  azelastine-fluticasone 137-50 mcg/spray nasal spray,non-aerosol spray 1 spray in each nostril by intranasal route 2 times per day   Active, Disp: , Rfl:   •  busPIRone (BUSPAR) 15 MG tablet, buspirone 15 mg oral tablet take 0.5 tablet (7.5 mg) by oral route 2 times per day   Active, Disp: , Rfl:   •  diphenhydrAMINE-acetaminophen (Tylenol PM Extra Strength)  MG tablet per tablet, 1 tablet Every Night., Disp: , Rfl:   •  esomeprazole (nexIUM) 20 MG capsule, Take 20 mg by mouth Every Morning Before Breakfast., Disp: , Rfl:   •  fexofenadine (ALLEGRA) 180 MG tablet, Take 180 mg by mouth Daily., Disp: , Rfl:   •  finasteride (PROSCAR) 5 MG tablet, finasteride 5 mg oral tablet take 0.5 tablet by oral route daily   Active, Disp: , Rfl:   •  folic acid (FOLVITE) 1 MG tablet, Take 1,000 mcg by mouth Daily., Disp: , Rfl:   •  gabapentin (NEURONTIN) 300 MG capsule, Take 600 mg by mouth 2 (Two) Times a Day., Disp: , Rfl:   •  ipratropium (ATROVENT) 0.06 % nasal spray, USE 2 SPRAYS IN EACH NOSTRIL EVERY 6 HOURS AS NEEDED, Disp: , Rfl:   •  montelukast (SINGULAIR) 10 MG tablet, montelukast 10 mg oral tablet take 1 tablet (10 mg) by oral route once daily in the evening   Active, Disp: , Rfl:   •  naproxen (NAPROSYN) 500 MG tablet, Take 500 mg by mouth 2 (Two) Times a Day With Meals., Disp: , Rfl:   •  simvastatin (ZOCOR) 40 MG tablet, simvastatin 40 mg oral tablet take 1 tablet (40 mg) by oral route once daily in the evening   Active, Disp: , Rfl:   •  tolterodine LA (DETROL LA) 2 MG 24 hr capsule, Take 2 mg by mouth Daily., Disp: , Rfl:   •  vitamin D3 125 MCG (5000 UT) capsule capsule, Take 1 capsule by mouth Daily., Disp: , Rfl:      No Known Allergies    Social History     Tobacco Use   • Smoking status: Never   • Smokeless tobacco: Never   Vaping Use   • Vaping Use: Never used   Substance Use Topics   • Alcohol use: Not Currently   • Drug use: Never        Objective     Vital Signs:   Temp 97.1 °F (36.2 °C) (Temporal)   " Ht 165.1 cm (65\")   Wt 67.8 kg (149 lb 6.4 oz)   BMI 24.86 kg/m²       Physical Exam    General: Well developed, well nourished patient of stated age in no acute distress. Voice is strong and clear.   Head: Normocephalic and atraumatic.   Face: No lesions. House-Brackmann I/VI bilaterally.   muscles and temporomandibular joint nontender to palpation.  No TMJ crepitus.  Eyes: PERRLA, sclerae anicteric, no conjunctival injection. Extra ocular movements are intact and full. No nystagmus.   Ears: Auricles are normal in appearance. Bilateral external auditory canals are unremarkable.  Right tympanic membrane with T-tube in place and patent.  Left tympanic membrane with T-tube in place and patent.  No otorrhea.  Hearing normal to conversational voice.   Psychiatric: Appropriate affect, cooperative     Procedures     Result Review :               Assessment and Plan    Diagnoses and all orders for this visit:   Diagnosis Plan   1. Sensory hearing loss, bilateral        2. Dysfunction of both eustachian tubes        3. Tinnitus of both ears          Impressions and findings were discussed at great length.  Currently, his tinnitus has worsened and he feels as though his hearing is affecting his job performance.  Examination today reveals both T tubes to be in place and patent.  We discussed repeating an audiogram but that based on his previous audiograms he is cleared for binaural amplification if he so desires.  He was given ample time to ask questions, all of which were answered to his satisfaction.    Follow Up   No follow-ups on file.  Patient was given instructions and counseling regarding his condition or for health maintenance advice. Please see specific information pulled into the AVS if appropriate.    "

## 2023-02-27 ENCOUNTER — DOCUMENTATION (OUTPATIENT)
Dept: OTOLARYNGOLOGY | Facility: CLINIC | Age: 58
End: 2023-02-27
Payer: COMMERCIAL

## 2023-02-27 RX ORDER — CIPROFLOXACIN AND DEXAMETHASONE 3; 1 MG/ML; MG/ML
3 SUSPENSION/ DROPS AURICULAR (OTIC) 2 TIMES DAILY
Qty: 7.5 ML | Refills: 0 | Status: SHIPPED | OUTPATIENT
Start: 2023-02-27 | End: 2023-03-06

## 2024-02-19 ENCOUNTER — OFFICE VISIT (OUTPATIENT)
Dept: OTOLARYNGOLOGY | Facility: CLINIC | Age: 59
End: 2024-02-19
Payer: COMMERCIAL

## 2024-02-19 VITALS
SYSTOLIC BLOOD PRESSURE: 166 MMHG | TEMPERATURE: 97.8 F | DIASTOLIC BLOOD PRESSURE: 89 MMHG | HEART RATE: 98 BPM | OXYGEN SATURATION: 98 %

## 2024-02-19 DIAGNOSIS — H65.23 CHRONIC SEROUS OTITIS MEDIA OF BOTH EARS: ICD-10-CM

## 2024-02-19 DIAGNOSIS — H90.3 SENSORY HEARING LOSS, BILATERAL: Primary | ICD-10-CM

## 2024-02-19 DIAGNOSIS — H93.13 TINNITUS OF BOTH EARS: ICD-10-CM

## 2024-02-19 PROCEDURE — 99213 OFFICE O/P EST LOW 20 MIN: CPT | Performed by: OTOLARYNGOLOGY

## 2024-02-19 RX ORDER — CYCLOBENZAPRINE HCL 5 MG
TABLET ORAL
COMMUNITY
Start: 2024-01-25

## 2024-02-19 RX ORDER — METAXALONE 800 MG/1
1 TABLET ORAL 3 TIMES DAILY
COMMUNITY
Start: 2024-01-12

## 2024-02-19 RX ORDER — DEXAMETHASONE SODIUM PHOSPHATE 1 MG/ML
SOLUTION/ DROPS OPHTHALMIC
Qty: 3 ML | Refills: 0 | Status: SHIPPED | OUTPATIENT
Start: 2024-02-19

## 2024-02-19 RX ORDER — FAMOTIDINE 20 MG/1
1 TABLET, FILM COATED ORAL DAILY
COMMUNITY
Start: 2023-11-27

## 2024-02-19 NOTE — PROGRESS NOTES
Patient Name: Delgado Jerome   Visit Date: 02/19/2024   Patient ID: [id number]  Provider: Bryson Ayala MD    Sex: male  Location: [facility] [place] [facility]   YOB: 1965  Location Address: [address] [place] [address], [address] [place] [address],?[place]?[id number]    Primary Care Provider Marco A Thomas PA  Location Phone: [phone]    Referring Provider: KATHY Krishnan        Chief Complaint  Cerumen Impaction    History of Present Illness  Delgado Jerome is a 58 y.o. male who presents to [facility] today for follow-up of bilateral conductive hearing loss, left ear pressure, and bilateral eustachian tube dysfunction.  He was originally seen on 2/15/2021 at which time he reported bilateral high-pitched tinnitus since childhood and nasal congestion for which he had been using Dymista, Singulair, fexofenadine, and saline irrigations with some improvement.  Examination that day revealed a slight left septal deviation and prominent maxillary crest bilaterally as well as inferior turbinate hypertrophy.  Audiogram on 4/5/2021 revealed right normal downsloping to mild to moderate conductive loss and left mild rising to normal downsloping to mild rising to normal downsloping to moderate conductive loss.  SRT was 10 on the right and 15 on the left.  Speech discrimination was 96% on the right and 90% on the left at 60 dB.  Tympanograms are type C bilaterally. Audiogram on 6/14/2021 revealed bilateral normal downsloping to mild to severe high-frequency sensorineural hearing loss on the right and mild high-frequency sensorineural hearing loss on the left.  SRT is 15 bilaterally.  Speech discrimination is 100% bilaterally at 50 dB.  Tympanograms are type B with expanded volumes.     He returns today for follow-up status post bilateral myringotomy and tube placement in clinic on 4/28/2021 along with bilateral myringotomy and T-tube placement on 1/3/2023.  He was last seen on  2/15/2023 at which time he reported worsening tinnitus and he was cleared for binaural amplification. He feels like his tinnitus has improved somewhat and has been using his hearing aids.  He denies any otalgia or otorrhea but does report that his ears will occasionally itch.  Audiogram on 5/5/2023 at [facility] revealed right normal downsloping to mild to moderate sensorineural hearing loss and left mild rising to normal downsloping to mild to moderate sensorineural hearing loss.  SRT was 15 bilaterally speech discrimination was 100% on the right and 96% on the left at 55 dB.    Past Medical History:   Diagnosis Date    Anemia, unspecified     Arthritis     Arthritis of carpometacarpal (CMC) joint of left thumb 11/15/2017    Arthritis of carpometacarpal (CMC) joint of right thumb 01/10/2018    Blood clot in vein     left leg    Chest pain     Chronic allergic rhinitis     Conductive hearing loss, bilateral     Dental disease     teeth grinding    Deviated nasal septum     ETD (Eustachian tube dysfunction), bilateral     Foot pain, left 03/28/2019    GERD (gastroesophageal reflux disease)     High cholesterol     Hypertrophy of both inferior nasal turbinates     Medial epicondylitis of elbow, left 11/14/2017    Medial epicondylitis of elbow, right 01/10/2018    Mood disorder     Haley neuroma, 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    Nasal obstruction     Numbness in both legs     Shortness of breath     Tinnitus     Tinnitus, bilateral     Urgency of urination 05/15/2017       Past Surgical History:   Procedure Laterality Date    ANKLE SURGERY      APPENDECTOMY      EAR TUBES      KNEE MENISCAL REPAIR      MYRINGOTOMY W/ TUBES Bilateral 1/3/2023    Procedure: MYRINGOTOMY WITH INSERTION OF EAR TUBES;  Surgeon: Bryson Ayala MD;  Location: [facility] [place] [facility] [place]  Service: ENT;  Laterality: Bilateral;    REPLACEMENT TOTAL KNEE      Right knee         Current Outpatient Medications:     Azelastine-Fluticasone 137-50 MCG/ACT  suspension, azelastine-fluticasone 137-50 mcg/spray nasal spray,non-aerosol spray 1 spray in each nostril by intranasal route 2 times per day   Active, Disp: , Rfl:     busPIRone (BUSPAR) 15 MG tablet, buspirone 15 mg oral tablet take 0.5 tablet (7.5 mg) by oral route 2 times per day   Active, Disp: , Rfl:     cyclobenzaprine (FLEXERIL) 5 MG tablet, TAKE 1 TO 2 TABLETS BY MOUTH THREE TIMES DAILY AS NEEDED FOR MUSCLE SPASMS, Disp: , Rfl:     diphenhydrAMINE-acetaminophen (Tylenol PM Extra Strength)  MG tablet per tablet, 1 tablet Every Night., Disp: , Rfl:     esomeprazole (nexIUM) 20 MG capsule, Take 1 capsule by mouth Every Morning Before Breakfast., Disp: , Rfl:     famotidine (PEPCID) 20 MG tablet, Take 1 tablet by mouth Daily., Disp: , Rfl:     fexofenadine (ALLEGRA) 180 MG tablet, Take 1 tablet by mouth Daily., Disp: , Rfl:     finasteride (PROSCAR) 5 MG tablet, finasteride 5 mg oral tablet take 0.5 tablet by oral route daily   Active, Disp: , Rfl:     folic acid (FOLVITE) 1 MG tablet, Take 1 tablet by mouth Daily., Disp: , Rfl:     gabapentin (NEURONTIN) 300 MG capsule, Take 2 capsules by mouth 2 (Two) Times a Day., Disp: , Rfl:     ipratropium (ATROVENT) 0.06 % nasal spray, USE 2 SPRAYS IN EACH NOSTRIL EVERY 6 HOURS AS NEEDED, Disp: , Rfl:     metaxalone (SKELAXIN) 800 MG tablet, Take 1 tablet by mouth 3 times a day., Disp: , Rfl:     montelukast (SINGULAIR) 10 MG tablet, montelukast 10 mg oral tablet take 1 tablet (10 mg) by oral route once daily in the evening   Active, Disp: , Rfl:     naproxen (NAPROSYN) 500 MG tablet, Take 1 tablet by mouth 2 (Two) Times a Day With Meals., Disp: , Rfl:     simvastatin (ZOCOR) 40 MG tablet, simvastatin 40 mg oral tablet take 1 tablet (40 mg) by oral route once daily in the evening   Active, Disp: , Rfl:     tolterodine LA (DETROL LA) 2 MG 24 hr capsule, Take 1 capsule by mouth Daily., Disp: , Rfl:     vitamin D3 125 MCG (5000 UT) capsule capsule, Take 1 capsule by  mouth Daily., Disp: , Rfl:     dexAMETHasone (DECADRON) 0.1 % ophthalmic solution, 3 drops to both ears twice daily x 14 days, Disp: 3 mL, Rfl: 0     No Known Allergies    Social History     Tobacco Use    Smoking status: Never    Smokeless tobacco: Never   Vaping Use    Vaping Use: Never used   Substance Use Topics    Alcohol use: Not Currently    Drug use: Never        Objective     Vital Signs:   /89   Pulse 98   Temp 97.8 °F (36.6 °C)   SpO2 98%       Physical Exam    General: Well developed, well nourished patient of stated age in no acute distress. Voice is strong and clear.   Head: Normocephalic and atraumatic.              Face: No lesions. House-Brackmann I/VI bilaterally.   muscles and temporomandibular joint nontender to palpation.  No TMJ crepitus.  Eyes: PERRLA, sclerae anicteric, no conjunctival injection. Extra ocular movements are intact and full. No nystagmus.   Ears: Auricles are normal in appearance. Bilateral external auditory canals are unremarkable.  Bilateral tympanic membranes with T tubes in place and patent.  There is right greater than left crusting along the barrel of the tube.  Attempts were made to remove this crusting bilaterally using the operating microscope and a straight pick and was successful on the left.  No otorrhea.  Hearing normal to conversational voice.              Nose: External nose is normal in appearance. Bilateral nares are patent with normal appearing mucosa. Septum deviated to the left. Turbinates are  hypertrophied. No lesions.   Oral Cavity: Lips are normal in appearance. Oral mucosa is unremarkable. Gingiva is unremarkable. Normal dentition for age. Tongue is unremarkable with good movement. Hard palate is unremarkable.              Oropharynx: Soft palate is unremarkable with full movement. Uvula is unremarkable. Bilateral tonsils are unremarkable. Posterior oropharynx is unremarkable.               Larynx and hypopharynx: Deferred secondary  to gag reflex.  Neck: Supple.  No mass.  Nontender to palpation.  Trachea midline. Thyroid normal size and without nodules to palpation.              Lymphatic: No lymphadenopathy upon palpation.              Psychiatric: Appropriate affect, cooperative              Neurologic: Oriented x 3, strength symmetric in all extremities, Cranial Nerves II-XII are grossly intact to confrontation              Skin: Warm and dry. No rashes.     Procedures     Result Review :               Assessment and Plan    Diagnoses and all orders for this visit:   Diagnosis Plan   1. Sensory hearing loss, bilateral        2. Tinnitus of both ears        3. Chronic serous otitis media of both ears            Impressions and findings were discussed at great length.  Currently, he feels as though his tinnitus has improved and he is doing well wearing hearing aids.  Examination today reveals mild right greater than left crusting around the barrel of the tubes bilaterally.  Attempts were made at removing the crusting more successfully on the left than the right.  At this time, he will be placed on a course of dexamethasone drops and will follow-up with me in 1 year.  He was given ample time to ask questions, all of which were answered to his satisfaction.    Follow Up   Return in about 1 year (around 2/19/2025).  Patient was given instructions and counseling regarding his condition or for health maintenance advice. Please see specific information pulled into the AVS if appropriate.

## 2024-08-16 ENCOUNTER — TELEPHONE (OUTPATIENT)
Dept: GASTROENTEROLOGY | Facility: CLINIC | Age: 59
End: 2024-08-16
Payer: COMMERCIAL

## 2024-08-16 NOTE — TELEPHONE ENCOUNTER
Attempted to contact patient, left voicemail message to return call. Provided direct contact information.    Scheduled patient with KATHY Marinelli on Wednesday, 08.22.24 at 0830.

## 2024-08-22 ENCOUNTER — OFFICE VISIT (OUTPATIENT)
Dept: GASTROENTEROLOGY | Facility: CLINIC | Age: 59
End: 2024-08-22
Payer: COMMERCIAL

## 2024-08-22 VITALS
HEART RATE: 82 BPM | SYSTOLIC BLOOD PRESSURE: 135 MMHG | OXYGEN SATURATION: 100 % | WEIGHT: 155.8 LBS | HEIGHT: 65 IN | BODY MASS INDEX: 25.96 KG/M2 | DIASTOLIC BLOOD PRESSURE: 94 MMHG

## 2024-08-22 DIAGNOSIS — K59.00 CONSTIPATION, UNSPECIFIED CONSTIPATION TYPE: ICD-10-CM

## 2024-08-22 DIAGNOSIS — R19.5 OCCULT BLOOD IN STOOLS: ICD-10-CM

## 2024-08-22 DIAGNOSIS — K21.9 GASTROESOPHAGEAL REFLUX DISEASE, UNSPECIFIED WHETHER ESOPHAGITIS PRESENT: ICD-10-CM

## 2024-08-22 DIAGNOSIS — D50.9 IRON DEFICIENCY ANEMIA, UNSPECIFIED IRON DEFICIENCY ANEMIA TYPE: Primary | ICD-10-CM

## 2024-08-22 RX ORDER — POLYETHYLENE GLYCOL 3350, SODIUM SULFATE, SODIUM CHLORIDE, POTASSIUM CHLORIDE, ASCORBIC ACID, SODIUM ASCORBATE 140-9-5.2G
1 KIT ORAL TAKE AS DIRECTED
Qty: 1 EACH | Refills: 0 | Status: SHIPPED | OUTPATIENT
Start: 2024-08-22 | End: 2024-08-23

## 2024-08-22 NOTE — PROGRESS NOTES
Chief Complaint  Black or Bloody Stool (- Occult blood positive ) and Constipation (- Every other day )    Delgado Jerome is a 58 y.o. male who presents to [facility] on referral from No ref. provider found for a gastroenterology evaluation of RADHA and occult blood positive.      History of Present Illness  New patient presents to the office for RADHA and occult blood positive.  He has been struggling with increased constipation over the last week after completing antibiotics due to sinus infection.  Taking MiraLAX with minimal relief.  He has some left-sided discomfort.  Denies melena and hematochezia.  Heartburn is well-controlled with Nexium 20 mg daily.  Denies breakthrough heartburn, nausea, vomiting, epigastric pain, and dysphagia.  Denies unintentional weight loss.    Past Medical History:   Diagnosis Date    Anemia, unspecified     Arthritis     Arthritis of carpometacarpal (CMC) joint of left thumb 11/15/2017    Arthritis of carpometacarpal (CMC) joint of right thumb 01/10/2018    Blood clot in vein     left leg    Chest pain     Chronic allergic rhinitis     Conductive hearing loss, bilateral     Dental disease     teeth grinding    Deviated nasal septum     ETD (Eustachian tube dysfunction), bilateral     Foot pain, left 03/28/2019    GERD (gastroesophageal reflux disease)     High cholesterol     Hypertrophy of both inferior nasal turbinates     Medial epicondylitis of elbow, left 11/14/2017    Medial epicondylitis of elbow, right 01/10/2018    Mood disorder     Hlaey neuroma, 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    Nasal obstruction     Numbness in both legs     Shortness of breath     Tinnitus     Tinnitus, bilateral     Urgency of urination 05/15/2017       Past Surgical History:   Procedure Laterality Date    ANKLE SURGERY      APPENDECTOMY      COLONOSCOPY      EAR TUBES      KNEE MENISCAL REPAIR      MYRINGOTOMY W/ TUBES Bilateral 01/03/2023    Procedure: MYRINGOTOMY WITH  INSERTION OF EAR TUBES;  Surgeon: Bryson Ayala MD;  Location: [facility] [place];  Service: ENT;  Laterality: Bilateral;    REPLACEMENT TOTAL KNEE      Right knee         Current Outpatient Medications:     Azelastine-Fluticasone 137-50 MCG/ACT suspension, azelastine-fluticasone 137-50 mcg/spray nasal spray,non-aerosol spray 1 spray in each nostril by intranasal route 2 times per day   Active, Disp: , Rfl:     busPIRone (BUSPAR) 15 MG tablet, buspirone 15 mg oral tablet take 0.5 tablet (7.5 mg) by oral route 2 times per day   Active, Disp: , Rfl:     cyclobenzaprine (FLEXERIL) 5 MG tablet, TAKE 1 TO 2 TABLETS BY MOUTH THREE TIMES DAILY AS NEEDED FOR MUSCLE SPASMS, Disp: , Rfl:     dexAMETHasone (DECADRON) 0.1 % ophthalmic solution, 3 drops to both ears twice daily x 14 days, Disp: 3 mL, Rfl: 0    diphenhydrAMINE-acetaminophen (Tylenol PM Extra Strength)  MG tablet per tablet, 1 tablet Every Night., Disp: , Rfl:     esomeprazole (nexIUM) 20 MG capsule, Take 1 capsule by mouth Every Morning Before Breakfast., Disp: , Rfl:     famotidine (PEPCID) 20 MG tablet, Take 1 tablet by mouth Daily., Disp: , Rfl:     fexofenadine (ALLEGRA) 180 MG tablet, Take 1 tablet by mouth Daily., Disp: , Rfl:     finasteride (PROSCAR) 5 MG tablet, finasteride 5 mg oral tablet take 0.5 tablet by oral route daily   Active, Disp: , Rfl:     folic acid (FOLVITE) 1 MG tablet, Take 1 tablet by mouth Daily., Disp: , Rfl:     gabapentin (NEURONTIN) 300 MG capsule, Take 2 capsules by mouth 2 (Two) Times a Day., Disp: , Rfl:     ipratropium (ATROVENT) 0.06 % nasal spray, USE 2 SPRAYS IN EACH NOSTRIL EVERY 6 HOURS AS NEEDED, Disp: , Rfl:     metaxalone (SKELAXIN) 800 MG tablet, Take 1 tablet by mouth 3 times a day., Disp: , Rfl:     montelukast (SINGULAIR) 10 MG tablet, montelukast 10 mg oral tablet take 1 tablet (10 mg) by oral route once daily in the evening   Active, Disp: , Rfl:     simvastatin (ZOCOR) 40 MG tablet, simvastatin 40  "mg oral tablet take 1 tablet (40 mg) by oral route once daily in the evening   Active, Disp: , Rfl:     tolterodine LA (DETROL LA) 2 MG 24 hr capsule, Take 1 capsule by mouth Daily., Disp: , Rfl:     vitamin D3 125 MCG (5000 UT) capsule capsule, Take 1 capsule by mouth Daily., Disp: , Rfl:     naproxen (NAPROSYN) 500 MG tablet, Take 1 tablet by mouth 2 (Two) Times a Day With Meals. (Patient not taking: Reported on 8/22/2024), Disp: , Rfl:     PEG-KCl-NaCl-NaSulf-Na Asc-C (Plenvu) 140 g reconstituted solution solution, Take 140 g by mouth Take As Directed for 1 day. Attn: Pharmacist: please see notes for coupon code., Disp: 1 each, Rfl: 0     No Known Allergies    Family History   Problem Relation Age of Onset    Pancreatic cancer Father     Cancer Father     Stroke Maternal Grandmother     Heart disease Paternal Grandmother     Colon cancer Neg Hx         Social History     Social History Narrative    Not on file       Immunization:  Immunization History   Administered Date(s) Administered    COVID-19 (MODERNA) 1st,2nd,3rd Dose Monovalent 02/05/2021, 03/04/2021    COVID-19 (MODERNA) BIVALENT 12+YRS 11/18/2021    COVID-19 (MODERNA) Monovalent Original Booster 11/18/2021    COVID-19 (PFIZER) BIVALENT 12+YRS 11/28/2022    COVID-19 F23 (PFIZER) 12YRS+ (COMIRNATY) 10/17/2023    Covid-19 (Pfizer) Gray Cap Monovalent 05/03/2022    Fluzone (or Fluarix & Flulaval for VFC) >6mos 10/02/2019, 09/17/2020, 09/21/2021, 10/17/2023    Fluzone Quad >6mos (Multi-dose) 10/24/2022    Hepatitis A 05/09/2019    Influenza, Unspecified 10/24/2022    Shingrix 04/02/2021, 09/21/2021    Tdap 07/09/2020        Objective     Vital Signs:   /94 (BP Location: Left arm, Patient Position: Sitting, Cuff Size: Adult)   Pulse 82   Ht 165.1 cm (65\")   Wt 70.7 kg (155 lb 12.8 oz)   SpO2 100%   BMI 25.93 kg/m²       Physical Exam  Constitutional:       Appearance: Normal appearance. He is normal weight.   HENT:      Head: Normocephalic and " atraumatic.      Nose: Nose normal.   Pulmonary:      Effort: Pulmonary effort is normal.   Skin:     General: Skin is warm and dry.   Neurological:      Mental Status: He is alert and oriented to person, place, and time. Mental status is at baseline.   Psychiatric:         Mood and Affect: Mood normal.         Behavior: Behavior normal.         Thought Content: Thought content normal.         Judgment: Judgment normal.         Result Review :     CBC w/diff          10/17/2023    09:26 4/26/2024    09:03   CBC w/Diff   WBC 7.18     5.85       RBC 3.79     4.27       Hemoglobin 11.6     13.1       Hematocrit 34.7     39.3       MCV 91.6     92.0       MCH 30.6     30.7       MCHC 33.4     33.3       RDW 13.0     12.8       Platelets 186     239       Neutrophil Rel % 60.8     54.9       Immature Granulocyte Rel % 3.3     0.9       Lymphocyte Rel % 22.8     30.1       Monocyte Rel % 10.3     10.3       Eosinophil Rel % 2.4     3.1       Basophil Rel % 0.4     0.7          Details          This result is from an external source.                       Assessment and Plan    Diagnoses and all orders for this visit:    1. Iron deficiency anemia, unspecified iron deficiency anemia type (Primary)  -     Case Request; Standing  -     Implement Anesthesia orders day of procedure.; Standing  -     Verify NPO; Standing  -     Verify bowel prep was successful; Standing  -     Give tap water enema if bowel prep was insufficient; Standing  -     Obtain Informed Consent; Standing  -     Case Request    2. Occult blood in stools  -     Case Request; Standing  -     Implement Anesthesia orders day of procedure.; Standing  -     Verify NPO; Standing  -     Verify bowel prep was successful; Standing  -     Give tap water enema if bowel prep was insufficient; Standing  -     Obtain Informed Consent; Standing  -     Case Request    3. Constipation, unspecified constipation type  -     Case Request; Standing  -     Implement Anesthesia  orders day of procedure.; Standing  -     Verify NPO; Standing  -     Verify bowel prep was successful; Standing  -     Give tap water enema if bowel prep was insufficient; Standing  -     Obtain Informed Consent; Standing  -     Case Request    4. Gastroesophageal reflux disease, unspecified whether esophagitis present  -     Case Request; Standing  -     Implement Anesthesia orders day of procedure.; Standing  -     Verify NPO; Standing  -     Verify bowel prep was successful; Standing  -     Give tap water enema if bowel prep was insufficient; Standing  -     Obtain Informed Consent; Standing  -     Case Request    Other orders  -     PEG-KCl-NaCl-NaSulf-Na Asc-C (Plenvu) 140 g reconstituted solution solution; Take 140 g by mouth Take As Directed for 1 day. Attn: Pharmacist: please see notes for coupon code.  Dispense: 1 each; Refill: 0    Increase Miralax dose to BID  Denies cardiopulmonary history  EGD/COLONOSCOPY Surgical Risk and Benefits: Possible risk/complications, benefits, and alternatives to surgical or invasive procedure have been explained to patient and/or legal guardian. Risks include bleeding, infection, and perforation. Patient has been evaluated and can tolerate anesthesia and/or sedation. Risk, benefits, and alternatives to anesthesia and sedation have been explained to patient and/or legal guardian.   Pending endoscopy findings may consider small bowel evaluation with capsule    Follow Up   No follow-ups on file.  Patient was given instructions and counseling regarding his condition or for health maintenance advice. Please see specific information pulled into the AVS if appropriate.

## 2024-09-13 ENCOUNTER — ANESTHESIA EVENT (OUTPATIENT)
Dept: GASTROENTEROLOGY | Facility: HOSPITAL | Age: 59
End: 2024-09-13
Payer: COMMERCIAL

## 2024-09-16 ENCOUNTER — HOSPITAL ENCOUNTER (OUTPATIENT)
Facility: HOSPITAL | Age: 59
Setting detail: HOSPITAL OUTPATIENT SURGERY
Discharge: HOME | End: 2024-09-16
Attending: INTERNAL MEDICINE | Admitting: INTERNAL MEDICINE
Payer: COMMERCIAL

## 2024-09-16 ENCOUNTER — ANESTHESIA (OUTPATIENT)
Dept: GASTROENTEROLOGY | Facility: HOSPITAL | Age: 59
End: 2024-09-16
Payer: COMMERCIAL

## 2024-09-16 VITALS
SYSTOLIC BLOOD PRESSURE: 115 MMHG | OXYGEN SATURATION: 99 % | DIASTOLIC BLOOD PRESSURE: 90 MMHG | HEART RATE: 70 BPM | BODY MASS INDEX: 25.09 KG/M2 | TEMPERATURE: 96.9 F | RESPIRATION RATE: 20 BRPM | WEIGHT: 150.79 LBS

## 2024-09-16 DIAGNOSIS — K21.9 GASTROESOPHAGEAL REFLUX DISEASE, UNSPECIFIED WHETHER ESOPHAGITIS PRESENT: ICD-10-CM

## 2024-09-16 DIAGNOSIS — D50.9 IRON DEFICIENCY ANEMIA, UNSPECIFIED IRON DEFICIENCY ANEMIA TYPE: ICD-10-CM

## 2024-09-16 DIAGNOSIS — R19.5 OCCULT BLOOD IN STOOLS: ICD-10-CM

## 2024-09-16 DIAGNOSIS — K59.00 CONSTIPATION, UNSPECIFIED CONSTIPATION TYPE: ICD-10-CM

## 2024-09-16 PROCEDURE — 25010000002 PROPOFOL 10 MG/ML EMULSION

## 2024-09-16 PROCEDURE — 88342 IMHCHEM/IMCYTCHM 1ST ANTB: CPT | Performed by: INTERNAL MEDICINE

## 2024-09-16 PROCEDURE — 43239 EGD BIOPSY SINGLE/MULTIPLE: CPT | Performed by: INTERNAL MEDICINE

## 2024-09-16 PROCEDURE — 88305 TISSUE EXAM BY PATHOLOGIST: CPT | Performed by: INTERNAL MEDICINE

## 2024-09-16 PROCEDURE — 25810000003 LACTATED RINGERS PER 1000 ML: Performed by: NURSE ANESTHETIST, CERTIFIED REGISTERED

## 2024-09-16 PROCEDURE — 25810000003 LACTATED RINGERS PER 1000 ML

## 2024-09-16 PROCEDURE — 45380 COLONOSCOPY AND BIOPSY: CPT | Performed by: INTERNAL MEDICINE

## 2024-09-16 PROCEDURE — 88341 IMHCHEM/IMCYTCHM EA ADD ANTB: CPT | Performed by: INTERNAL MEDICINE

## 2024-09-16 RX ORDER — SCOLOPAMINE TRANSDERMAL SYSTEM 1 MG/1
1 PATCH, EXTENDED RELEASE TRANSDERMAL ONCE
Status: DISCONTINUED | OUTPATIENT
Start: 2024-09-16 | End: 2024-09-16 | Stop reason: HOSPADM

## 2024-09-16 RX ORDER — PROPOFOL 10 MG/ML
VIAL (ML) INTRAVENOUS AS NEEDED
Status: DISCONTINUED | OUTPATIENT
Start: 2024-09-16 | End: 2024-09-16 | Stop reason: SURG

## 2024-09-16 RX ORDER — LIDOCAINE HYDROCHLORIDE 20 MG/ML
INJECTION, SOLUTION EPIDURAL; INFILTRATION; INTRACAUDAL; PERINEURAL AS NEEDED
Status: DISCONTINUED | OUTPATIENT
Start: 2024-09-16 | End: 2024-09-16 | Stop reason: SURG

## 2024-09-16 RX ORDER — SODIUM CHLORIDE, SODIUM LACTATE, POTASSIUM CHLORIDE, CALCIUM CHLORIDE 600; 310; 30; 20 MG/100ML; MG/100ML; MG/100ML; MG/100ML
30 INJECTION, SOLUTION INTRAVENOUS CONTINUOUS
Status: DISCONTINUED | OUTPATIENT
Start: 2024-09-16 | End: 2024-09-16 | Stop reason: HOSPADM

## 2024-09-16 RX ORDER — SODIUM CHLORIDE, SODIUM LACTATE, POTASSIUM CHLORIDE, CALCIUM CHLORIDE 600; 310; 30; 20 MG/100ML; MG/100ML; MG/100ML; MG/100ML
INJECTION, SOLUTION INTRAVENOUS CONTINUOUS PRN
Status: DISCONTINUED | OUTPATIENT
Start: 2024-09-16 | End: 2024-09-16 | Stop reason: SURG

## 2024-09-16 RX ADMIN — SCOPALAMINE 1 PATCH: 1 PATCH, EXTENDED RELEASE TRANSDERMAL at 07:09

## 2024-09-16 RX ADMIN — LIDOCAINE HYDROCHLORIDE 100 MG: 20 INJECTION, SOLUTION EPIDURAL; INFILTRATION; INTRACAUDAL; PERINEURAL at 07:40

## 2024-09-16 RX ADMIN — SODIUM CHLORIDE, POTASSIUM CHLORIDE, SODIUM LACTATE AND CALCIUM CHLORIDE: 600; 310; 30; 20 INJECTION, SOLUTION INTRAVENOUS at 07:40

## 2024-09-16 RX ADMIN — PROPOFOL 100 MG: 10 INJECTION, EMULSION INTRAVENOUS at 07:43

## 2024-09-16 RX ADMIN — PROPOFOL 180 MCG/KG/MIN: 10 INJECTION, EMULSION INTRAVENOUS at 07:43

## 2024-09-16 RX ADMIN — SODIUM CHLORIDE, POTASSIUM CHLORIDE, SODIUM LACTATE AND CALCIUM CHLORIDE 30 ML/HR: 600; 310; 30; 20 INJECTION, SOLUTION INTRAVENOUS at 06:36

## 2024-09-17 LAB
CYTO UR: NORMAL
LAB AP CASE REPORT: NORMAL
LAB AP CLINICAL INFORMATION: NORMAL
LAB AP SPECIAL STAINS: NORMAL
PATH REPORT.FINAL DX SPEC: NORMAL
PATH REPORT.GROSS SPEC: NORMAL

## 2024-09-18 ENCOUNTER — TELEPHONE (OUTPATIENT)
Dept: GASTROENTEROLOGY | Facility: CLINIC | Age: 59
End: 2024-09-18
Payer: COMMERCIAL

## 2024-09-18 DIAGNOSIS — D64.9 ANEMIA, UNSPECIFIED TYPE: ICD-10-CM

## 2024-09-18 DIAGNOSIS — A63.0 ANAL CONDYLOMA: Primary | ICD-10-CM

## 2024-10-02 ENCOUNTER — PREP FOR SURGERY (OUTPATIENT)
Dept: OTHER | Facility: HOSPITAL | Age: 59
End: 2024-10-02
Payer: COMMERCIAL

## 2024-10-02 ENCOUNTER — OFFICE VISIT (OUTPATIENT)
Dept: SURGERY | Facility: CLINIC | Age: 59
End: 2024-10-02
Payer: COMMERCIAL

## 2024-10-02 DIAGNOSIS — A63.0 ANAL CONDYLOMA: Primary | ICD-10-CM

## 2024-10-02 RX ORDER — SODIUM PHOSPHATE,MONO-DIBASIC 19G-7G/118
1 ENEMA (ML) RECTAL ONCE
OUTPATIENT
Start: 2024-10-02 | End: 2024-10-02

## 2024-10-02 RX ORDER — SODIUM CHLORIDE 0.9 % (FLUSH) 0.9 %
10 SYRINGE (ML) INJECTION EVERY 12 HOURS SCHEDULED
OUTPATIENT
Start: 2024-10-02

## 2024-10-02 RX ORDER — SODIUM CHLORIDE 9 MG/ML
40 INJECTION, SOLUTION INTRAVENOUS AS NEEDED
OUTPATIENT
Start: 2024-10-02

## 2024-10-02 RX ORDER — SODIUM CHLORIDE, SODIUM LACTATE, POTASSIUM CHLORIDE, CALCIUM CHLORIDE 600; 310; 30; 20 MG/100ML; MG/100ML; MG/100ML; MG/100ML
50 INJECTION, SOLUTION INTRAVENOUS CONTINUOUS
OUTPATIENT
Start: 2024-10-02

## 2024-10-02 RX ORDER — SODIUM CHLORIDE 0.9 % (FLUSH) 0.9 %
10 SYRINGE (ML) INJECTION AS NEEDED
OUTPATIENT
Start: 2024-10-02

## 2024-10-02 NOTE — LETTER
October 3, 2024       No Recipients    Patient: Delgado Jerome   YOB: 1965   Date of Visit: 10/2/2024     Dear KATHY Cardoso:       Thank you for referring Delgado Jerome to me for evaluation. Below are the relevant portions of my assessment and plan of care.    If you have questions, please do not hesitate to call me. I look forward to following Delgado along with you.         Sincerely,        Daljit Issa MD        CC:   No Recipients    Daljit Issa MD  10/03/24 1320  Sign when Signing Visit  General Surgery/Colorectal Surgery Note    Patient Name:  Delgado Jerome  YOB: 1965  [id number]    Referring Provider: Kelly Walker APRN      Patient Care Team:  Marco A Thomas PA as PCP - General (Physician Assistant)  Bryson Ayala MD as Consulting Physician (Otolaryngology)    Chief complaint anal condyloma    Subjective.     History of present illness:    History of anemia undergoing colonoscopy by Dr. 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  No blood thinner use.  No chest pain.  HIV testing negative in 2018.  No symptoms.  No pain.  No blood per rectum.      History:  Past Medical History:   Diagnosis Date   • Anemia, unspecified    • Arthritis    • Arthritis of carpometacarpal (CMC) joint of left thumb 11/15/2017   • Arthritis of carpometacarpal (CMC) joint of right thumb 01/10/2018   • Blood clot in vein     left leg   • Chest pain    • Chronic allergic rhinitis    • Conductive hearing loss, bilateral    • Dental disease     teeth grinding   • Deviated nasal septum    • ETD (Eustachian tube dysfunction), bilateral    • Foot pain, left 03/28/2019   • GERD (gastroesophageal reflux disease)    • High cholesterol    • Hypertrophy of both inferior nasal turbinates    • Medial epicondylitis of elbow, left 11/14/2017   • Medial  epicondylitis of elbow, right 01/10/2018   • Mood disorder    • Haely neuroma, 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;  Service: Gastroenterology;  Laterality: N/A;  anal lesion   • EAR TUBES     • ENDOSCOPY N/A 9/16/2024    Procedure: ESOPHAGOGASTRODUODENOSCOPY with biopsy;  Surgeon: Pipo Jaffe MD;  Location: [facility] ENDOS[facility];  Service: Gastroenterology;  Laterality: N/A;  hiatal hernia   • KNEE MENISCAL REPAIR     • MYRINGOTOMY W/ TUBES Bilateral 01/03/2023    Procedure: MYRINGOTOMY WITH INSERTION OF EAR TUBES;  Surgeon: Bryson Ayala MD;  Location: [facility];  Service: ENT;  Laterality: Bilateral;   • REPLACEMENT TOTAL KNEE      Right knee       Family History   Problem Relation Age of Onset   • Pancreatic cancer Father    • Cancer Father    • Stroke Maternal Grandmother    • Heart disease Paternal Grandmother    • Colon cancer Neg Hx        Social History     Tobacco Use   • Smoking status: Never     Passive exposure: Never   • Smokeless tobacco: Never   Vaping Use   • Vaping status: Never Used   Substance Use Topics   • Alcohol use: Not Currently   • Drug use: Never       Review of Systems  All systems were reviewed and negative except for:   Review of Systems   Constitutional: Negative for chills, fever and unexpected weight loss.   HENT: Negative for congestion, nosebleeds and voice change.    Eyes: Negative for blurred vision, double vision and discharge.   Respiratory: Negative for apnea, chest tightness and shortness of breath.    Cardiovascular: Negative for chest pain and leg swelling.   Gastrointestinal:        See HPI   Endocrine:  Negative for cold intolerance and heat intolerance.   Genitourinary: Negative for dysuria, hematuria and urgency.   Musculoskeletal: Negative for back pain, joint swelling and neck pain.   Skin: Negative for color change and dry skin.   Neurological: Negative for dizziness and confusion.   Hematological: Negative for adenopathy.   Psychiatric/Behavioral: Negative for agitation and behavioral problems.     MEDS:  Prior to Admission medications    Medication Sig Start Date End Date Taking? Authorizing Provider   Azelastine-Fluticasone 137-50 MCG/ACT suspension azelastine-fluticasone 137-50 mcg/spray nasal spray,non-aerosol spray 1 spray in each nostril by intranasal route 2 times per day   Active   Yes Angelina Malcolm MD   cyclobenzaprine (FLEXERIL) 5 MG tablet TAKE 1 TO 2 TABLETS BY MOUTH THREE TIMES DAILY AS NEEDED FOR MUSCLE SPASMS 1/25/24  Yes Angelina Malcolm MD   dexAMETHasone (DECADRON) 0.1 % ophthalmic solution 3 drops to both ears twice daily x 14 days 2/19/24  Yes Bryson Ayala MD   diphenhydrAMINE-acetaminophen (Tylenol PM Extra Strength)  MG tablet per tablet 1 tablet Every Night.   Yes Angelina Malcolm MD   esomeprazole (nexIUM) 20 MG capsule Take 1 capsule by mouth Every Morning Before Breakfast. 8/29/22  Yes Angelina Malcolm MD   famotidine (PEPCID) 20 MG tablet Take 1 tablet by mouth Daily. 11/27/23  Yes Angelina Malcolm MD   fexofenadine (ALLEGRA) 180 MG tablet Take 1 tablet by mouth Daily.   Yes Angelina Malcolm MD   finasteride (PROSCAR) 5 MG tablet finasteride 5 mg oral tablet take 0.5 tablet by oral route daily   Active   Yes Angelina Malcolm MD   folic acid (FOLVITE) 1 MG tablet Take 1 tablet by mouth Daily. 4/14/21  Yes Angelina Malcolm MD   gabapentin (NEURONTIN) 300 MG capsule Take 2 capsules by mouth 2 (Two) Times a Day.   Yes Angelina Malcolm MD   ipratropium (ATROVENT) 0.06 % nasal spray USE 2 SPRAYS IN EACH NOSTRIL EVERY 6  HOURS AS NEEDED 12/21/22  Yes Angelina Malcolm MD   metaxalone (SKELAXIN) 800 MG tablet Take 1 tablet by mouth 3 times a day. 1/12/24  Yes Angelina Malcolm MD   montelukast (SINGULAIR) 10 MG tablet montelukast 10 mg oral tablet take 1 tablet (10 mg) by oral route once daily in the evening   Active   Yes Angelina Malcolm MD   tolterodine LA (DETROL LA) 2 MG 24 hr capsule Take 1 capsule by mouth Daily. 3/30/21  Yes Angelina Malcolm MD   vitamin D3 125 MCG (5000 UT) capsule capsule Take 1 capsule by mouth Daily. 5/2/21  Yes Angelina Malcolm MD   busPIRone (BUSPAR) 15 MG tablet buspirone 15 mg oral tablet take 0.5 tablet (7.5 mg) by oral route 2 times per day   Active  Patient not taking: Reported on 10/2/2024    Angelina Malcolm MD   simvastatin (ZOCOR) 40 MG tablet simvastatin 40 mg oral tablet take 1 tablet (40 mg) by oral route once daily in the evening   Active  Patient not taking: Reported on 10/2/2024    Angelina Malcolm MD        Allergies:  Patient has no known allergies.    Objective    Vital Signs        Physical Exam:     General Appearance:    Alert, cooperative, in no acute distress   Head:    Normocephalic, without obvious abnormality, atraumatic   Eyes:          Conjunctivae and sclerae normal, no icterus,     Ears:    Ears appear intact with no abnormalities noted   Throat:   No oral lesions, no thrush, oral mucosa moist   Neck:   No adenopathy, supple, trachea midline, no thyromegaly   Back:     No kyphosis present, no scoliosis present, no skin lesions,      erythema or scars, no tenderness to percussion or                   palpation,   range of motion normal   Lungs:     Clear to auscultation,respirations regular, even and                  unlabored    Heart:    Regular rhythm and normal rate, normal S1 and S2, no            murmur, no gallop, no rub, no click   Chest Wall:    No abnormalities observed   Abdomen:     Normal bowel sounds, no masses, no organomegaly,  "soft        non-tender, non-distended, no guarding, no rebound                tenderness   Rectal:        Extremities:   Moves all extremities well, no edema, no cyanosis, no             redness   Pulses:   Pulses palpable and equal bilaterally   Skin:   No bleeding, bruising or rash   Lymph nodes:   No palpable adenopathy   Neurologic:   A/o x 4 with no deficits       Results Review:   {Results Review:05477::\"I reviewed the patient's new clinical results.\"    LABS/IMAGING:  Results for orders placed or performed during the hospital encounter of 09/16/24   Tissue Pathology Exam    Specimen: A: Anus; Tissue    B: Small Intestine, Duodenum; Tissue    C: Esophagus, Distal; Tissue   Result Value Ref Range    Case Report       Surgical Pathology Report                         Case: [id number]                                  Authorizing Provider:  Pipo Jaffe MD    Collected:           09/16/2024 07:59 AM          Ordering Location:     [facility] Received:            09/16/2024 08:56 AM                                 SUITES                                                                       Pathologist:           Jenni Marino MD                                                     Specimens:   1) - Anus, anal lesion biopsy                                                                       2) - Small Intestine, Duodenum, duodenum biopsy                                                     3) - Esophagus, Distal, Distal esophagus biopsy                                            Clinical Information       Iron deficiency anemia, unspecified iron deficiency anemia type  Occult blood in stools  Constipation, unspecified constipation type  Gastroesophageal reflux disease, unspecified whether esophagitis present      Final Diagnosis       1.  Anal lesion, biopsy:   -High-grade squamous intraepithelial lesion (AIN 2-3)      2. Duodenum, biopsy:   - No significant pathologic change   - " "Preserved villous architecture and no increase in intraepithelial lymphocytes      3. Distal esophagus, biopsy:   - Squamocolumnar mucosa with mild chronic inflammation   - Negative for intestinal metaplasia, dysplasia and malignancy          Gross Description       1. Anus.  Received in formalin labeled \"anal lesion biopsy\" consists of 2 pink-tan soft tissue fragments 0.2 and 0.3 cm in greatest dimension.  Specimens are submitted entirely in cassette 1A.    2. Small Intestine, Duodenum.  Received in formalin labeled \"duodenum biopsy\" consists of multiple pink-tan soft tissue fragments ranging from 0.2 to 0.4 cm.  The specimens are submitted entirely in cassette 2A.    3. Esophagus, Distal.  Received in formalin labeled \"distal esophagus biopsy\" consists of multiple pink-tan soft tissue fragments ranging from minute to 0.5 cm in greatest dimension.  The specimens are submitted entirely cassette 3A.  PDF        Special Stains       Immunoperoxidase stains, performed on block 1A, yielded the following results: P16 demonstrates zones of full-thickness blocklike staining and Ki-67 is increased full-thickness.  The histomorphology seen, together with the immunostain results, supports the above diagnosis.    All immunohistochemical/cytochemical stains (IHC) are performed on separate slides per different antibody unless otherwise specified in the documentation that a cocktail (multiple stain) was performed.  Controls are appropriate.        Microscopic Description       Microscopic examination performed.          Result Review:     Assessment & Plan    History of anemia undergoing colonoscopy by Dr. 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  I reviewed the colonoscopy and pathology with the patient.  I recommended exam under anesthesia with excision and fulguration of anal condyloma.  Procedure and recovery " discussed.  Benefits and alternatives discussed.  Risk procedure including risk of anesthesia, bleeding, infection, recurrence, need for more surgery, pain, heart attack, stroke, blood clot, pneumonia discussed.  All questions answered.  He agrees with the plan.  Orders placed.  He was instructed to use chlorhexidine the night before surgery.  Thank you for the consult.              This document has been electronically signed by Daljit Issa MD  October 3, 2024 13:18 EDT

## 2024-10-03 NOTE — PROGRESS NOTES
General Surgery/Colorectal Surgery Note    Patient Name:  Delgado Jerome  YOB: 1965  [id number]    Referring Provider: Kelly Walker APRN      Patient Care Team:  Marco A Thomas PA as PCP - General (Physician Assistant)  Bryson Ayala MD as Consulting Physician (Otolaryngology)    Chief complaint anal condyloma    Subjective .     History of present illness:    History of anemia undergoing colonoscopy by Dr. 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  No blood thinner use.  No chest pain.  HIV testing negative in 2018.  No symptoms.  No pain.  No blood per rectum.      History:  Past Medical History:   Diagnosis Date    Anemia, unspecified     Arthritis     Arthritis of carpometacarpal (CMC) joint of left thumb 11/15/2017    Arthritis of carpometacarpal (CMC) joint of right thumb 01/10/2018    Blood clot in vein     left leg    Chest pain     Chronic allergic rhinitis     Conductive hearing loss, bilateral     Dental disease     teeth grinding    Deviated nasal septum     ETD (Eustachian tube dysfunction), bilateral     Foot pain, left 03/28/2019    GERD (gastroesophageal reflux disease)     High cholesterol     Hypertrophy of both inferior nasal turbinates     Medial epicondylitis of elbow, left 11/14/2017    Medial epicondylitis of elbow, right 01/10/2018    Mood disorder     Haley neuroma, 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    Nasal obstruction     Numbness in both legs     Tinnitus     Tinnitus, bilateral     Urgency of urination 05/15/2017       Past Surgical History:   Procedure Laterality Date    ANKLE SURGERY      APPENDECTOMY      COLONOSCOPY      COLONOSCOPY N/A 9/16/2024    Procedure: COLONOSCOPY with biopsy;  Surgeon: Pipo Jaffe MD;  Location: [facility] ENDOSCOPY;  Service: Gastroenterology;  Laterality: N/A;  anal lesion    EAR TUBES      ENDOSCOPY N/A  9/16/2024    Procedure: ESOPHAGOGASTRODUODENOSCOPY with biopsy;  Surgeon: Pipo Jaffe MD;  Location: [facility];  Service: Gastroenterology;  Laterality: N/A;  hiatal hernia    KNEE MENISCAL REPAIR      MYRINGOTOMY W/ TUBES Bilateral 01/03/2023    Procedure: MYRINGOTOMY WITH INSERTION OF EAR TUBES;  Surgeon: Bryson Ayala MD;  Location: [facility] OR [facility];  Service: ENT;  Laterality: Bilateral;    REPLACEMENT TOTAL KNEE      Right knee       Family History   Problem Relation Age of Onset    Pancreatic cancer Father     Cancer Father     Stroke Maternal Grandmother     Heart disease Paternal Grandmother     Colon cancer Neg Hx        Social History     Tobacco Use    Smoking status: Never     Passive exposure: Never    Smokeless tobacco: Never   Vaping Use    Vaping status: Never Used   Substance Use Topics    Alcohol use: Not Currently    Drug use: Never       Review of Systems  All systems were reviewed and negative except for:   Review of Systems   Constitutional: Negative for chills, fever and unexpected weight loss.   HENT: Negative for congestion, nosebleeds and voice change.    Eyes: Negative for blurred vision, double vision and discharge.   Respiratory: Negative for apnea, chest tightness and shortness of breath.    Cardiovascular: Negative for chest pain and leg swelling.   Gastrointestinal:        See HPI   Endocrine: Negative for cold intolerance and heat intolerance.   Genitourinary: Negative for dysuria, hematuria and urgency.   Musculoskeletal: Negative for back pain, joint swelling and neck pain.   Skin: Negative for color change and dry skin.   Neurological: Negative for dizziness and confusion.   Hematological: Negative for adenopathy.   Psychiatric/Behavioral: Negative for agitation and behavioral problems.     MEDS:  Prior to Admission medications    Medication Sig Start Date End Date Taking? Authorizing Provider   Azelastine-Fluticasone 137-50 MCG/ACT suspension  azelastine-fluticasone 137-50 mcg/spray nasal spray,non-aerosol spray 1 spray in each nostril by intranasal route 2 times per day   Active   Yes ProviderAngelina MD   cyclobenzaprine (FLEXERIL) 5 MG tablet TAKE 1 TO 2 TABLETS BY MOUTH THREE TIMES DAILY AS NEEDED FOR MUSCLE SPASMS 1/25/24  Yes Angelina Malcolm MD   dexAMETHasone (DECADRON) 0.1 % ophthalmic solution 3 drops to both ears twice daily x 14 days 2/19/24  Yes Bryson Ayala MD   diphenhydrAMINE-acetaminophen (Tylenol PM Extra Strength)  MG tablet per tablet 1 tablet Every Night.   Yes Angelina Malcolm MD   esomeprazole (nexIUM) 20 MG capsule Take 1 capsule by mouth Every Morning Before Breakfast. 8/29/22  Yes ProviderAngelina MD   famotidine (PEPCID) 20 MG tablet Take 1 tablet by mouth Daily. 11/27/23  Yes ProviderAngelina MD   fexofenadine (ALLEGRA) 180 MG tablet Take 1 tablet by mouth Daily.   Yes Provider, MD Angelina   finasteride (PROSCAR) 5 MG tablet finasteride 5 mg oral tablet take 0.5 tablet by oral route daily   Active   Yes ProviderAngelina MD   folic acid (FOLVITE) 1 MG tablet Take 1 tablet by mouth Daily. 4/14/21  Yes ProviderAngelina MD   gabapentin (NEURONTIN) 300 MG capsule Take 2 capsules by mouth 2 (Two) Times a Day.   Yes ProviderAngelina MD   ipratropium (ATROVENT) 0.06 % nasal spray USE 2 SPRAYS IN EACH NOSTRIL EVERY 6 HOURS AS NEEDED 12/21/22  Yes ProviderAngelina MD   metaxalone (SKELAXIN) 800 MG tablet Take 1 tablet by mouth 3 times a day. 1/12/24  Yes ProviderAngelina MD   montelukast (SINGULAIR) 10 MG tablet montelukast 10 mg oral tablet take 1 tablet (10 mg) by oral route once daily in the evening   Active   Yes ProviderAngelina MD   tolterodine LA (DETROL LA) 2 MG 24 hr capsule Take 1 capsule by mouth Daily. 3/30/21  Yes Angelina Malcolm MD   vitamin D3 125 MCG (5000 UT) capsule capsule Take 1 capsule by mouth Daily. 5/2/21  Yes Provider  "MD Angelina   busPIRone (BUSPAR) 15 MG tablet buspirone 15 mg oral tablet take 0.5 tablet (7.5 mg) by oral route 2 times per day   Active  Patient not taking: Reported on 10/2/2024    Angelina Malcolm MD   simvastatin (ZOCOR) 40 MG tablet simvastatin 40 mg oral tablet take 1 tablet (40 mg) by oral route once daily in the evening   Active  Patient not taking: Reported on 10/2/2024    ProviderAngelina MD        Allergies:  Patient has no known allergies.    Objective     Vital Signs        Physical Exam:     General Appearance:    Alert, cooperative, in no acute distress   Head:    Normocephalic, without obvious abnormality, atraumatic   Eyes:          Conjunctivae and sclerae normal, no icterus,     Ears:    Ears appear intact with no abnormalities noted   Throat:   No oral lesions, no thrush, oral mucosa moist   Neck:   No adenopathy, supple, trachea midline, no thyromegaly   Back:     No kyphosis present, no scoliosis present, no skin lesions,      erythema or scars, no tenderness to percussion or                   palpation,   range of motion normal   Lungs:     Clear to auscultation,respirations regular, even and                  unlabored    Heart:    Regular rhythm and normal rate, normal S1 and S2, no            murmur, no gallop, no rub, no click   Chest Wall:    No abnormalities observed   Abdomen:     Normal bowel sounds, no masses, no organomegaly, soft        non-tender, non-distended, no guarding, no rebound                tenderness   Rectal:        Extremities:   Moves all extremities well, no edema, no cyanosis, no             redness   Pulses:   Pulses palpable and equal bilaterally   Skin:   No bleeding, bruising or rash   Lymph nodes:   No palpable adenopathy   Neurologic:   A/o x 4 with no deficits       Results Review:   {Results Review:77211::\"I reviewed the patient's new clinical results.\"    LABS/IMAGING:  Results for orders placed or performed during the hospital encounter of " "09/16/24   Tissue Pathology Exam    Specimen: A: Anus; Tissue    B: Small Intestine, Duodenum; Tissue    C: Esophagus, Distal; Tissue   Result Value Ref Range    Case Report       Surgical Pathology Report                         Case: [id number]                                  Authorizing Provider:  Pipo Jaffe MD    Collected:           09/16/2024 07:59 AM          Ordering Location:     [facility] Received:            09/16/2024 08:56 AM                                 SUITES                                                                       Pathologist:           Jenni Marino MD                                                     Specimens:   1) - Anus, anal lesion biopsy                                                                       2) - Small Intestine, Duodenum, duodenum biopsy                                                     3) - Esophagus, Distal, Distal esophagus biopsy                                            Clinical Information       Iron deficiency anemia, unspecified iron deficiency anemia type  Occult blood in stools  Constipation, unspecified constipation type  Gastroesophageal reflux disease, unspecified whether esophagitis present      Final Diagnosis       1.  Anal lesion, biopsy:   -High-grade squamous intraepithelial lesion (AIN 2-3)      2. Duodenum, biopsy:   - No significant pathologic change   - Preserved villous architecture and no increase in intraepithelial lymphocytes      3. Distal esophagus, biopsy:   - Squamocolumnar mucosa with mild chronic inflammation   - Negative for intestinal metaplasia, dysplasia and malignancy          Gross Description       1. Anus.  Received in formalin labeled \"anal lesion biopsy\" consists of 2 pink-tan soft tissue fragments 0.2 and 0.3 cm in greatest dimension.  Specimens are submitted entirely in cassette 1A.    2. Small Intestine, Duodenum.  Received in formalin labeled \"duodenum biopsy\" consists of " "multiple pink-tan soft tissue fragments ranging from 0.2 to 0.4 cm.  The specimens are submitted entirely in cassette 2A.    3. Esophagus, Distal.  Received in formalin labeled \"distal esophagus biopsy\" consists of multiple pink-tan soft tissue fragments ranging from minute to 0.5 cm in greatest dimension.  The specimens are submitted entirely cassette 3A.  PDF        Special Stains       Immunoperoxidase stains, performed on block 1A, yielded the following results: P16 demonstrates zones of full-thickness blocklike staining and Ki-67 is increased full-thickness.  The histomorphology seen, together with the immunostain results, supports the above diagnosis.    All immunohistochemical/cytochemical stains (IHC) are performed on separate slides per different antibody unless otherwise specified in the documentation that a cocktail (multiple stain) was performed.  Controls are appropriate.        Microscopic Description       Microscopic examination performed.          Result Review :     Assessment & Plan     History of anemia undergoing colonoscopy by Dr. 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  I reviewed the colonoscopy and pathology with the patient.  I recommended exam under anesthesia with excision and fulguration of anal condyloma.  Procedure and recovery discussed.  Benefits and alternatives discussed.  Risk procedure including risk of anesthesia, bleeding, infection, recurrence, need for more surgery, pain, heart attack, stroke, blood clot, pneumonia discussed.  All questions answered.  He agrees with the plan.  Orders placed.  He was instructed to use chlorhexidine the night before surgery.  Thank you for the consult.              This document has been electronically signed by Daljit Issa MD  October 3, 2024 13:18 EDT  "

## 2024-10-03 NOTE — H&P (VIEW-ONLY)
General Surgery/Colorectal Surgery Note    Patient Name:  Delgado Jerome  YOB: 1965  [id number]    Referring Provider: Kelly Walker APRN      Patient Care Team:  Marco A Thomas PA as PCP - General (Physician Assistant)  Bryson Ayala MD as Consulting Physician (Otolaryngology)    Chief complaint anal condyloma    Subjective .     History of present illness:    History of anemia undergoing colonoscopy by Dr. 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  No blood thinner use.  No chest pain.  HIV testing negative in 2018.  No symptoms.  No pain.  No blood per rectum.      History:  Past Medical History:   Diagnosis Date    Anemia, unspecified     Arthritis     Arthritis of carpometacarpal (CMC) joint of left thumb 11/15/2017    Arthritis of carpometacarpal (CMC) joint of right thumb 01/10/2018    Blood clot in vein     left leg    Chest pain     Chronic allergic rhinitis     Conductive hearing loss, bilateral     Dental disease     teeth grinding    Deviated nasal septum     ETD (Eustachian tube dysfunction), bilateral     Foot pain, left 03/28/2019    GERD (gastroesophageal reflux disease)     High cholesterol     Hypertrophy of both inferior nasal turbinates     Medial epicondylitis of elbow, left 11/14/2017    Medial epicondylitis of elbow, right 01/10/2018    Mood disorder     Haley neuroma, 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 ENDOSCOPY;  Service: Gastroenterology;  Laterality: N/A;  hiatal hernia    KNEE MENISCAL REPAIR      MYRINGOTOMY W/ TUBES Bilateral 01/03/2023    Procedure: MYRINGOTOMY WITH INSERTION OF EAR TUBES;  Surgeon: Bryson Ayala MD;  Location: [facility] [place] [facility];  Service: ENT;  Laterality: Bilateral;    REPLACEMENT TOTAL KNEE      Right knee       Family History   Problem Relation Age of Onset    Pancreatic cancer Father     Cancer Father     Stroke Maternal Grandmother     Heart disease Paternal Grandmother     Colon cancer Neg Hx        Social History     Tobacco Use    Smoking status: Never     Passive exposure: Never    Smokeless tobacco: Never   Vaping Use    Vaping status: Never Used   Substance Use Topics    Alcohol use: Not Currently    Drug use: Never       Review of Systems  All systems were reviewed and negative except for:   Review of Systems   Constitutional: Negative for chills, fever and unexpected weight loss.   HENT: Negative for congestion, nosebleeds and voice change.    Eyes: Negative for blurred vision, double vision and discharge.   Respiratory: Negative for apnea, chest tightness and shortness of breath.    Cardiovascular: Negative for chest pain and leg swelling.   Gastrointestinal:        See HPI   Endocrine: Negative for cold intolerance and heat intolerance.   Genitourinary: Negative for dysuria, hematuria and urgency.   Musculoskeletal: Negative for back pain, joint swelling and neck pain.   Skin: Negative for color change and dry skin.   Neurological: Negative for dizziness and confusion.   Hematological: Negative for adenopathy.   Psychiatric/Behavioral: Negative for agitation and behavioral problems.     MEDS:  Prior to Admission medications    Medication Sig Start Date End Date Taking? Authorizing Provider   Azelastine-Fluticasone 137-50 MCG/ACT suspension  azelastine-fluticasone 137-50 mcg/spray nasal spray,non-aerosol spray 1 spray in each nostril by intranasal route 2 times per day   Active   Yes ProviderAngelina MD   cyclobenzaprine (FLEXERIL) 5 MG tablet TAKE 1 TO 2 TABLETS BY MOUTH THREE TIMES DAILY AS NEEDED FOR MUSCLE SPASMS 1/25/24  Yes Angelina Malcolm MD   dexAMETHasone (DECADRON) 0.1 % ophthalmic solution 3 drops to both ears twice daily x 14 days 2/19/24  Yes Bryson Ayala MD   diphenhydrAMINE-acetaminophen (Tylenol PM Extra Strength)  MG tablet per tablet 1 tablet Every Night.   Yes Angelina Malcolm MD   esomeprazole (nexIUM) 20 MG capsule Take 1 capsule by mouth Every Morning Before Breakfast. 8/29/22  Yes ProviderAngelina MD   famotidine (PEPCID) 20 MG tablet Take 1 tablet by mouth Daily. 11/27/23  Yes ProviderAngelina MD   fexofenadine (ALLEGRA) 180 MG tablet Take 1 tablet by mouth Daily.   Yes Provider, MD Angelina   finasteride (PROSCAR) 5 MG tablet finasteride 5 mg oral tablet take 0.5 tablet by oral route daily   Active   Yes ProviderAngelina MD   folic acid (FOLVITE) 1 MG tablet Take 1 tablet by mouth Daily. 4/14/21  Yes ProviderAngelina MD   gabapentin (NEURONTIN) 300 MG capsule Take 2 capsules by mouth 2 (Two) Times a Day.   Yes ProviderAngelina MD   ipratropium (ATROVENT) 0.06 % nasal spray USE 2 SPRAYS IN EACH NOSTRIL EVERY 6 HOURS AS NEEDED 12/21/22  Yes ProviderAngelina MD   metaxalone (SKELAXIN) 800 MG tablet Take 1 tablet by mouth 3 times a day. 1/12/24  Yes ProviderAngelina MD   montelukast (SINGULAIR) 10 MG tablet montelukast 10 mg oral tablet take 1 tablet (10 mg) by oral route once daily in the evening   Active   Yes ProviderAngelina MD   tolterodine LA (DETROL LA) 2 MG 24 hr capsule Take 1 capsule by mouth Daily. 3/30/21  Yes Angelina Malcolm MD   vitamin D3 125 MCG (5000 UT) capsule capsule Take 1 capsule by mouth Daily. 5/2/21  Yes Provider  "MD Angelina   busPIRone (BUSPAR) 15 MG tablet buspirone 15 mg oral tablet take 0.5 tablet (7.5 mg) by oral route 2 times per day   Active  Patient not taking: Reported on 10/2/2024    Angelina Malcolm MD   simvastatin (ZOCOR) 40 MG tablet simvastatin 40 mg oral tablet take 1 tablet (40 mg) by oral route once daily in the evening   Active  Patient not taking: Reported on 10/2/2024    ProviderAngelina MD        Allergies:  Patient has no known allergies.    Objective     Vital Signs        Physical Exam:     General Appearance:    Alert, cooperative, in no acute distress   Head:    Normocephalic, without obvious abnormality, atraumatic   Eyes:          Conjunctivae and sclerae normal, no icterus,     Ears:    Ears appear intact with no abnormalities noted   Throat:   No oral lesions, no thrush, oral mucosa moist   Neck:   No adenopathy, supple, trachea midline, no thyromegaly   Back:     No kyphosis present, no scoliosis present, no skin lesions,      erythema or scars, no tenderness to percussion or                   palpation,   range of motion normal   Lungs:     Clear to auscultation,respirations regular, even and                  unlabored    Heart:    Regular rhythm and normal rate, normal S1 and S2, no            murmur, no gallop, no rub, no click   Chest Wall:    No abnormalities observed   Abdomen:     Normal bowel sounds, no masses, no organomegaly, soft        non-tender, non-distended, no guarding, no rebound                tenderness   Rectal:        Extremities:   Moves all extremities well, no edema, no cyanosis, no             redness   Pulses:   Pulses palpable and equal bilaterally   Skin:   No bleeding, bruising or rash   Lymph nodes:   No palpable adenopathy   Neurologic:   A/o x 4 with no deficits       Results Review:   {Results Review:[id number]::\"I reviewed the patient's new clinical results.\"    LABS/IMAGING:  Results for orders placed or performed during the hospital encounter of " "09/16/24   Tissue Pathology Exam    Specimen: A: Anus; Tissue    B: Small Intestine, Duodenum; Tissue    C: Esophagus, Distal; Tissue   Result Value Ref Range    Case Report       Surgical Pathology Report                         Case: [id number]                                  Authorizing Provider:  Pipo Jaffe MD    Collected:           09/16/2024 07:59 AM          Ordering Location:     [facility] Received:            09/16/2024 08:56 AM                                 SUITES                                                                       Pathologist:           Jenni Marino MD                                                     Specimens:   1) - Anus, anal lesion biopsy                                                                       2) - Small Intestine, Duodenum, duodenum biopsy                                                     3) - Esophagus, Distal, Distal esophagus biopsy                                            Clinical Information       Iron deficiency anemia, unspecified iron deficiency anemia type  Occult blood in stools  Constipation, unspecified constipation type  Gastroesophageal reflux disease, unspecified whether esophagitis present      Final Diagnosis       1.  Anal lesion, biopsy:   -High-grade squamous intraepithelial lesion (AIN 2-3)      2. Duodenum, biopsy:   - No significant pathologic change   - Preserved villous architecture and no increase in intraepithelial lymphocytes      3. Distal esophagus, biopsy:   - Squamocolumnar mucosa with mild chronic inflammation   - Negative for intestinal metaplasia, dysplasia and malignancy          Gross Description       1. Anus.  Received in formalin labeled \"anal lesion biopsy\" consists of 2 pink-tan soft tissue fragments 0.2 and 0.3 cm in greatest dimension.  Specimens are submitted entirely in cassette 1A.    2. Small Intestine, Duodenum.  Received in formalin labeled \"duodenum biopsy\" consists of " "multiple pink-tan soft tissue fragments ranging from 0.2 to 0.4 cm.  The specimens are submitted entirely in cassette 2A.    3. Esophagus, Distal.  Received in formalin labeled \"distal esophagus biopsy\" consists of multiple pink-tan soft tissue fragments ranging from minute to 0.5 cm in greatest dimension.  The specimens are submitted entirely cassette 3A.  PDF        Special Stains       Immunoperoxidase stains, performed on block 1A, yielded the following results: P16 demonstrates zones of full-thickness blocklike staining and Ki-67 is increased full-thickness.  The histomorphology seen, together with the immunostain results, supports the above diagnosis.    All immunohistochemical/cytochemical stains (IHC) are performed on separate slides per different antibody unless otherwise specified in the documentation that a cocktail (multiple stain) was performed.  Controls are appropriate.        Microscopic Description       Microscopic examination performed.          Result Review :     Assessment & Plan     History of anemia undergoing colonoscopy by Dr. 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  I reviewed the colonoscopy and pathology with the patient.  I recommended exam under anesthesia with excision and fulguration of anal condyloma.  Procedure and recovery discussed.  Benefits and alternatives discussed.  Risk procedure including risk of anesthesia, bleeding, infection, recurrence, need for more surgery, pain, heart attack, stroke, blood clot, pneumonia discussed.  All questions answered.  He agrees with the plan.  Orders placed.  He was instructed to use chlorhexidine the night before surgery.  Thank you for the consult.              This document has been electronically signed by Daljit Issa MD  October 3, 2024 13:18 EDT  "

## 2024-10-04 ENCOUNTER — TELEPHONE (OUTPATIENT)
Dept: SURGERY | Facility: CLINIC | Age: 59
End: 2024-10-04
Payer: COMMERCIAL

## 2024-10-04 NOTE — TELEPHONE ENCOUNTER
PATIENT POSITIVE FOR COVID, PER PAT THE TIME HAS TO WAIT IS 10 DAYS.     PATIENT RESCHEDULED FOR 10-16-24, SCHEDULED WITH JOVANNY IN SURGERY SCHEDULING. PATIENT AWARE OF DATE AND VOICED UNDERSTANDING.

## 2024-10-04 NOTE — TELEPHONE ENCOUNTER
PATIENT STOPPED BY OFFICE AND IS POSITIVE FOR COVID. SYMPTOMS STARTED ON 09-22 AND HE JUST TESTED POSITIVE THIS MORNING. WANTS TO KNOW IF HE CAN STILL HAVE HIS SURGERY.

## 2024-10-14 NOTE — PRE-PROCEDURE INSTRUCTIONS
ATIENT INSTRUCTED TO BE:    - NOTHING TO EAT AFTER MIDNIGHT OR CHEW, EXCEPT CAN HAVE CLEAR LIQUIDS 2 HOURS PRIOR TO SURGERY ARRIVAL TIME , NO MORE THAN 8 OZ. (NOTHING RED)     - TO HOLD ALL VITAMINS, SUPPLEMENTS, NSAIDS FOR ONE WEEK PRIOR TO THEIR SURGICAL PROCEDURE    - DO NOT TAKE ______________________ 7 DAYS PRIOR TO PROCEDURE PER ANESTHESIA RECOMMENDATIONS/INSTRUCTIONS     - INSTRUCTED PT TO USE SURGICAL SOAP 1 TIME THE NIGHT PRIOR TO SURGERY ___________ OR THE AM OF SURGERY _____________   USE THE SOAP FROM NECK TO TOES, AVOID THEIR FACE, HAIR, AND PRIVATE PARTS. IF USE THE SOAP THE NIGHT PRIOR TO SURGERY, CHANGE BED LINENS AND NO PETS IN THE BED.     INSTRUCTED NO LOTIONS, JEWELRY, PIERCINGS,  NAIL POLISH, OR DEODORANT DAY OF SURGERY    - IF DIABETIC, CHECK BLOOD GLUCOSE IF LESS THAN 70 OR HAVING SYMPTOMS CALL THE PREOP AREA FOR INSTRUCTIONS ON AM OF SURGERY ( [phone])    -INSTRUCTED TO TAKE THE FOLLOWING MEDICATIONS THE DAY OF SURGERY WITH SIPS OF WATER: AZELASTINE, BUSPIRONE, FOLIC ACID, NEXIUM, ALLEGRA, FINASTERIDE, GABAPENTIN, SKELAXIN, DETROL LA          - DO NOT BRING ANY MEDICATIONS WITH YOU TO THE HOSPITAL THE DAY OF SURGERY, EXCEPT IF USE INHALERS. BRING INHALERS DAY OF SURGERY       - BRING CPAP OR BIPAP TO THE HOSPITAL ONLY IF YOU ARE SPENDING THE NIGHT    - DO NOT SMOKE OR VAPE 24 HOURS PRIOR TO PROCEDURE PER ANESTHESIA REQUEST     -MAKE SURE YOU HAVE A RIDE HOME OR SOMEONE TO STAY WITH YOU THE DAY OF THE PROCEDURE AFTER YOU GO HOME     - FOLLOW ANY OTHER INSTRUCTIONS GIVEN TO YOU BY YOUR SURGEON'S OFFICE.     - DAY OF SURGERY _COME TO [facility]/ [facility], [facility] FLOOR. CHECK IN AT THE DESK FOR REGISTRATION/SURGERY    - YOU WILL RECEIVE A PHONE CALL THE DAY PRIOR TO SURGERY BETWEEN 1PM AND 4 PM WITH ARRIVAL TIME, IF YOUR SURGERY IS ON A MONDAY YOU WILL RECEIVE A CALL THE FRIDAY PRIOR TO SURGERY DATE    - BRING CASH OR CREDIT CARD FOR COPAYMENT OF MEDICATIONS AFTER SURGERY IF YOU USE THE  HOSPITAL PHARMACY (MEDS TO BED)    - PREADMISSION TESTING NURSE SILVESTRE DOMINGUEZ [phone] IF HAVE ANY QUESTIONS     -PATIENT PROVIDED THE NUMBER FOR PREOP SURGICAL DEPT IF HAD QUESTIONS AFTER HOURS PRIOR TO SURGERY ( [phone]).  INFORMED PT IF NO ANSWER, LEAVE A MESSAGE AND SOMEONE WILL RETURN THEIR CALL       PATIENT VERBALIZED UNDERSTANDING

## 2024-10-16 ENCOUNTER — HOSPITAL ENCOUNTER (OUTPATIENT)
Facility: HOSPITAL | Age: 59
Setting detail: HOSPITAL OUTPATIENT SURGERY
Discharge: HOME | End: 2024-10-16
Attending: SURGERY | Admitting: SURGERY
Payer: COMMERCIAL

## 2024-10-16 ENCOUNTER — ANESTHESIA (OUTPATIENT)
Dept: PERIOP | Facility: HOSPITAL | Age: 59
End: 2024-10-16
Payer: COMMERCIAL

## 2024-10-16 ENCOUNTER — ANESTHESIA EVENT (OUTPATIENT)
Dept: PERIOP | Facility: HOSPITAL | Age: 59
End: 2024-10-16
Payer: COMMERCIAL

## 2024-10-16 VITALS
OXYGEN SATURATION: 96 % | WEIGHT: 149.03 LBS | SYSTOLIC BLOOD PRESSURE: 124 MMHG | HEART RATE: 60 BPM | RESPIRATION RATE: 16 BRPM | HEIGHT: 65 IN | BODY MASS INDEX: 24.83 KG/M2 | DIASTOLIC BLOOD PRESSURE: 88 MMHG | TEMPERATURE: 97.8 F

## 2024-10-16 DIAGNOSIS — A63.0 ANAL CONDYLOMA: ICD-10-CM

## 2024-10-16 PROCEDURE — 88305 TISSUE EXAM BY PATHOLOGIST: CPT | Performed by: SURGERY

## 2024-10-16 PROCEDURE — 25010000002 ONDANSETRON PER 1 MG: Performed by: NURSE ANESTHETIST, CERTIFIED REGISTERED

## 2024-10-16 PROCEDURE — 25010000002 PROPOFOL 10 MG/ML EMULSION: Performed by: NURSE ANESTHETIST, CERTIFIED REGISTERED

## 2024-10-16 PROCEDURE — 25010000002 LIDOCAINE PF 2% 2 % SOLUTION: Performed by: NURSE ANESTHETIST, CERTIFIED REGISTERED

## 2024-10-16 PROCEDURE — 46924 DESTRUCTION ANAL LESION(S): CPT | Performed by: SURGERY

## 2024-10-16 PROCEDURE — 25010000002 FENTANYL CITRATE (PF) 50 MCG/ML SOLUTION: Performed by: NURSE ANESTHETIST, CERTIFIED REGISTERED

## 2024-10-16 PROCEDURE — 25010000002 DEXAMETHASONE SODIUM PHOSPHATE 100 MG/10ML SOLUTION: Performed by: SURGERY

## 2024-10-16 PROCEDURE — 25010000002 DEXAMETHASONE PER 1 MG: Performed by: NURSE ANESTHETIST, CERTIFIED REGISTERED

## 2024-10-16 PROCEDURE — 25810000003 LACTATED RINGERS PER 1000 ML: Performed by: ANESTHESIOLOGY

## 2024-10-16 PROCEDURE — 25010000002 MIDAZOLAM PER 1MG: Performed by: ANESTHESIOLOGY

## 2024-10-16 PROCEDURE — 25010000002 KETOROLAC TROMETHAMINE PER 15 MG: Performed by: NURSE ANESTHETIST, CERTIFIED REGISTERED

## 2024-10-16 PROCEDURE — 25010000002 BUPIVACAINE (PF) 0.25 % SOLUTION: Performed by: SURGERY

## 2024-10-16 PROCEDURE — 25010000002 BUPRENORPHINE PER 0.1 MG: Performed by: SURGERY

## 2024-10-16 PROCEDURE — 25010000002 CEFAZOLIN PER 500 MG: Performed by: SURGERY

## 2024-10-16 RX ORDER — FENTANYL CITRATE 50 UG/ML
INJECTION, SOLUTION INTRAMUSCULAR; INTRAVENOUS AS NEEDED
Status: DISCONTINUED | OUTPATIENT
Start: 2024-10-16 | End: 2024-10-16 | Stop reason: SURG

## 2024-10-16 RX ORDER — ULTRASOUND COUPLING MEDIUM
GEL (GRAM) TOPICAL AS NEEDED
Status: DISCONTINUED | OUTPATIENT
Start: 2024-10-16 | End: 2024-10-16 | Stop reason: HOSPADM

## 2024-10-16 RX ORDER — OXYCODONE HYDROCHLORIDE 5 MG/1
5 TABLET ORAL
Status: DISCONTINUED | OUTPATIENT
Start: 2024-10-16 | End: 2024-10-16 | Stop reason: HOSPADM

## 2024-10-16 RX ORDER — DEXAMETHASONE SODIUM PHOSPHATE 4 MG/ML
INJECTION, SOLUTION INTRA-ARTICULAR; INTRALESIONAL; INTRAMUSCULAR; INTRAVENOUS; SOFT TISSUE AS NEEDED
Status: DISCONTINUED | OUTPATIENT
Start: 2024-10-16 | End: 2024-10-16 | Stop reason: SURG

## 2024-10-16 RX ORDER — SCOLOPAMINE TRANSDERMAL SYSTEM 1 MG/1
1 PATCH, EXTENDED RELEASE TRANSDERMAL ONCE
Status: DISCONTINUED | OUTPATIENT
Start: 2024-10-16 | End: 2024-10-16 | Stop reason: HOSPADM

## 2024-10-16 RX ORDER — MEPERIDINE HYDROCHLORIDE 25 MG/ML
12.5 INJECTION INTRAMUSCULAR; INTRAVENOUS; SUBCUTANEOUS
Status: DISCONTINUED | OUTPATIENT
Start: 2024-10-16 | End: 2024-10-16 | Stop reason: HOSPADM

## 2024-10-16 RX ORDER — LIDOCAINE HYDROCHLORIDE 20 MG/ML
INJECTION, SOLUTION EPIDURAL; INFILTRATION; INTRACAUDAL; PERINEURAL AS NEEDED
Status: DISCONTINUED | OUTPATIENT
Start: 2024-10-16 | End: 2024-10-16 | Stop reason: SURG

## 2024-10-16 RX ORDER — LIDOCAINE 50 MG/G
1 OINTMENT TOPICAL
Qty: 35 G | Refills: 1 | Status: SHIPPED | OUTPATIENT
Start: 2024-10-16

## 2024-10-16 RX ORDER — SODIUM CHLORIDE, SODIUM LACTATE, POTASSIUM CHLORIDE, CALCIUM CHLORIDE 600; 310; 30; 20 MG/100ML; MG/100ML; MG/100ML; MG/100ML
50 INJECTION, SOLUTION INTRAVENOUS CONTINUOUS
Status: DISCONTINUED | OUTPATIENT
Start: 2024-10-16 | End: 2024-10-16 | Stop reason: HOSPADM

## 2024-10-16 RX ORDER — SODIUM CHLORIDE 9 MG/ML
40 INJECTION, SOLUTION INTRAVENOUS AS NEEDED
Status: DISCONTINUED | OUTPATIENT
Start: 2024-10-16 | End: 2024-10-16 | Stop reason: HOSPADM

## 2024-10-16 RX ORDER — OXYCODONE AND ACETAMINOPHEN 7.5; 325 MG/1; MG/1
1 TABLET ORAL EVERY 4 HOURS PRN
Qty: 20 TABLET | Refills: 0 | Status: SHIPPED | OUTPATIENT
Start: 2024-10-16 | End: 2025-10-16

## 2024-10-16 RX ORDER — PROPOFOL 10 MG/ML
VIAL (ML) INTRAVENOUS AS NEEDED
Status: DISCONTINUED | OUTPATIENT
Start: 2024-10-16 | End: 2024-10-16 | Stop reason: SURG

## 2024-10-16 RX ORDER — SODIUM CHLORIDE, SODIUM LACTATE, POTASSIUM CHLORIDE, CALCIUM CHLORIDE 600; 310; 30; 20 MG/100ML; MG/100ML; MG/100ML; MG/100ML
9 INJECTION, SOLUTION INTRAVENOUS CONTINUOUS PRN
Status: DISCONTINUED | OUTPATIENT
Start: 2024-10-16 | End: 2024-10-16 | Stop reason: HOSPADM

## 2024-10-16 RX ORDER — ONDANSETRON 2 MG/ML
INJECTION INTRAMUSCULAR; INTRAVENOUS AS NEEDED
Status: DISCONTINUED | OUTPATIENT
Start: 2024-10-16 | End: 2024-10-16 | Stop reason: SURG

## 2024-10-16 RX ORDER — SODIUM CHLORIDE 0.9 % (FLUSH) 0.9 %
10 SYRINGE (ML) INJECTION AS NEEDED
Status: DISCONTINUED | OUTPATIENT
Start: 2024-10-16 | End: 2024-10-16 | Stop reason: HOSPADM

## 2024-10-16 RX ORDER — SODIUM PHOSPHATE,MONO-DIBASIC 19G-7G/118
1 ENEMA (ML) RECTAL ONCE
Status: COMPLETED | OUTPATIENT
Start: 2024-10-16 | End: 2024-10-16

## 2024-10-16 RX ORDER — MIDAZOLAM HYDROCHLORIDE 2 MG/2ML
2 INJECTION, SOLUTION INTRAMUSCULAR; INTRAVENOUS ONCE
Status: COMPLETED | OUTPATIENT
Start: 2024-10-16 | End: 2024-10-16

## 2024-10-16 RX ORDER — KETOROLAC TROMETHAMINE 15 MG/ML
INJECTION, SOLUTION INTRAMUSCULAR; INTRAVENOUS AS NEEDED
Status: DISCONTINUED | OUTPATIENT
Start: 2024-10-16 | End: 2024-10-16 | Stop reason: SURG

## 2024-10-16 RX ORDER — PROMETHAZINE HYDROCHLORIDE 25 MG/1
25 SUPPOSITORY RECTAL ONCE AS NEEDED
Status: DISCONTINUED | OUTPATIENT
Start: 2024-10-16 | End: 2024-10-16 | Stop reason: HOSPADM

## 2024-10-16 RX ORDER — SODIUM CHLORIDE 0.9 % (FLUSH) 0.9 %
10 SYRINGE (ML) INJECTION EVERY 12 HOURS SCHEDULED
Status: DISCONTINUED | OUTPATIENT
Start: 2024-10-16 | End: 2024-10-16 | Stop reason: HOSPADM

## 2024-10-16 RX ORDER — ACETAMINOPHEN 500 MG
1000 TABLET ORAL ONCE
Status: COMPLETED | OUTPATIENT
Start: 2024-10-16 | End: 2024-10-16

## 2024-10-16 RX ORDER — ONDANSETRON 2 MG/ML
4 INJECTION INTRAMUSCULAR; INTRAVENOUS ONCE AS NEEDED
Status: DISCONTINUED | OUTPATIENT
Start: 2024-10-16 | End: 2024-10-16 | Stop reason: HOSPADM

## 2024-10-16 RX ORDER — POLYETHYLENE GLYCOL 3350 17 G/17G
17 POWDER, FOR SOLUTION ORAL 2 TIMES DAILY
Qty: 28 PACKET | Refills: 0 | Status: SHIPPED | OUTPATIENT
Start: 2024-10-16 | End: 2024-10-30

## 2024-10-16 RX ORDER — CYCLOBENZAPRINE HCL 5 MG
5 TABLET ORAL 3 TIMES DAILY
Qty: 15 TABLET | Refills: 0 | Status: SHIPPED | OUTPATIENT
Start: 2024-10-16 | End: 2024-10-21

## 2024-10-16 RX ORDER — PROMETHAZINE HYDROCHLORIDE 12.5 MG/1
25 TABLET ORAL ONCE AS NEEDED
Status: DISCONTINUED | OUTPATIENT
Start: 2024-10-16 | End: 2024-10-16 | Stop reason: HOSPADM

## 2024-10-16 RX ADMIN — SODIUM CHLORIDE, POTASSIUM CHLORIDE, SODIUM LACTATE AND CALCIUM CHLORIDE 9 ML/HR: 600; 310; 30; 20 INJECTION, SOLUTION INTRAVENOUS at 09:59

## 2024-10-16 RX ADMIN — DEXAMETHASONE SODIUM PHOSPHATE 4 MG: 4 INJECTION, SOLUTION INTRAMUSCULAR; INTRAVENOUS at 11:20

## 2024-10-16 RX ADMIN — ONDANSETRON HYDROCHLORIDE 4 MG: 2 SOLUTION INTRAMUSCULAR; INTRAVENOUS at 11:20

## 2024-10-16 RX ADMIN — SCOPALAMINE 1 PATCH: 1 PATCH, EXTENDED RELEASE TRANSDERMAL at 10:35

## 2024-10-16 RX ADMIN — ACETAMINOPHEN 1000 MG: 500 TABLET ORAL at 10:35

## 2024-10-16 RX ADMIN — SODIUM CHLORIDE 2000 MG: 9 INJECTION, SOLUTION INTRAVENOUS at 11:13

## 2024-10-16 RX ADMIN — KETOROLAC TROMETHAMINE 15 MG: 15 INJECTION, SOLUTION INTRAMUSCULAR; INTRAVENOUS at 11:34

## 2024-10-16 RX ADMIN — PROPOFOL 180 MG: 10 INJECTION, EMULSION INTRAVENOUS at 11:16

## 2024-10-16 RX ADMIN — FENTANYL CITRATE 100 MCG: 50 INJECTION, SOLUTION INTRAMUSCULAR; INTRAVENOUS at 11:16

## 2024-10-16 RX ADMIN — MIDAZOLAM HYDROCHLORIDE 2 MG: 1 INJECTION, SOLUTION INTRAMUSCULAR; INTRAVENOUS at 10:53

## 2024-10-16 RX ADMIN — SODIUM PHOSPHATE 1 ENEMA: 7; 19 ENEMA RECTAL at 09:59

## 2024-10-16 RX ADMIN — LIDOCAINE HYDROCHLORIDE 80 MG: 20 INJECTION, SOLUTION INTRAVENOUS at 11:16

## 2024-10-16 NOTE — OP NOTE
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    A Anus Tissue TISSUE PATHOLOGY EXAM   Daljit Issa MD 10/16/24 1127     Description: right anal canal    B Anus Tissue TISSUE PATHOLOGY EXAM   Daljit Issa MD 10/16/24 1127     Description: Anterior anal canal    C Anus Tissue TISSUE PATHOLOGY EXAM   Daljit Issa MD 10/16/24 1128     Description: posterior anal canal    D Anus Tissue TISSUE PATHOLOGY EXAM   Daljit Issa MD 10/16/24 1128     Description: left anal canal              Findings: Four-quadrant random biopsies taken of anal transition zone with each sent to pathology separately for permanent.  Fulguration of anal transition zone circumferentially.    Complications: None    Description of Procedure:   After all questions were answered, consent was verified.  Patient brought to the operating room per stretcher placed in supine position arms out all extremities padded.  Bilateral lower extremity SCDs placed.  Anesthesia induced.  Preoperative IV antibiotics administered.  Patient placed in candycane lithotomy.  Patient's perianal area prepped with Betadine.  Draped in usual sterile fashion.  Critical timeout taken.  Began the procedure with exam under  anesthesia.  No external condyloma noted.  Digital rectal exam with no mass and no blood.  Circumferential exam with Vuong bivalve retractor with small area of condyloma noted right anal canal.  I then performed excisional biopsy of right anal canal transition zone at the area of concern for condyloma.  It was sent to pathology for permanent.  I then fulgurated the anal transition zone at the right anal canal with Bovie electrocautery.  This was repeated in similar fashion to anterior, posterior, left anal canal with each sent to pathology separate for permanent.  I then fulgurated the anal transition zone circumferentially with Bovie electrocautery.  I verified hemostasis.  I infiltrated with local.  I placed Xeroform gauze in the anus followed by dressing.  At the end of the procedure all counts were correct.  I was present for the procedure.    Assistant: Roseline Palomo CSA was responsible for performing the following activities: Retraction, Suction, and Placing Dressing and their skilled assistance was necessary for the success of this case.    Daljit Issa MD     Date: 10/16/2024  Time: 11:41 EDT

## 2024-10-16 NOTE — ANESTHESIA PREPROCEDURE EVALUATION
Anesthesia Evaluation     Patient summary reviewed and Nursing notes reviewed   history of anesthetic complications:  PONV  NPO Solid Status: > 8 hours  NPO Liquid Status: > 2 hours           Airway   Mallampati: II  TM distance: >3 FB  Neck ROM: full  No difficulty expected  Dental - normal exam     Pulmonary - negative pulmonary ROS and normal exam    breath sounds clear to auscultation  Cardiovascular - normal exam  Exercise tolerance: good (4-7 METS)    Rhythm: regular  Rate: normal    (+) hypertension (no meds), hyperlipidemia      Neuro/Psych  (+) numbness, psychiatric history  GI/Hepatic/Renal/Endo    (+) GERD    Musculoskeletal     Abdominal  - normal exam   Substance History - negative use     OB/GYN negative ob/gyn ROS         Other   arthritis,     ROS/Med Hx Other: PAT Nursing Notes unavailable.               Anesthesia Plan    ASA 2     general     (Patient understands anesthesia not responsible for dental damage.)  intravenous induction     Anesthetic plan, risks, benefits, and alternatives have been provided, discussed and informed consent has been obtained with: patient.    Use of blood products discussed with patient .    Plan discussed with CRNA.    CODE STATUS:

## 2024-10-16 NOTE — DISCHARGE INSTRUCTIONS
DISCHARGE INSTRUCTIONS  SURGICAL / AMBULATORY  PROCEDURES      For your surgery you had:  General anesthesia (you may have a sore throat for the first 24 hours)  IV sedation.  Local anesthesia  Monitored anesthesia Care  You may experience dizziness, drowsiness, or light-headedness for several hours following surgery/procedure.  Do not stay alone today or tonight.  Limit your activity for 24 hours.  Resume your diet slowly.  Follow whatever special dietary instructions you may have been given by your doctor.  You should not drive or operate machinery, drink alcohol, or sign legally binding documents for 24 hours or while you are taking pain medication.  Last dose of pain medication was given at:   .  NOTIFY YOUR DOCTOR IF YOU EXPERIENCE ANY OF THE FOLLOWING:  Temperature greater than 101 degrees Fahrenheit  Shaking Chills  Redness or excessive drainage from incision  Nausea, vomiting and/or pain that is not controlled by prescribed medications  Increase in bleeding or bleeding that is excessive  Unable to urinate in 6 hours after surgery  If unable to reach your doctor, please go to the closest Emergency Room  You may shower  Friday  Apply an ice pack 24-48 hours.  Medications per physician instructions as indicated on Discharge Medication Information Sheet.  You should see   for follow-up care   on   .  Phone number:       SPECIAL INSTRUCTIONS:        Call for fever.  Remove yellow gauze from anus later today.  Expect some spotting of blood.  Sitz bath's daily and as needed.  Lots of laxatives.  Ibuprofen with food 3 times daily for the next few days.  Avoid sitting directly on the anus.             You had Tylenol at 10:35 am

## 2024-10-16 NOTE — ANESTHESIA POSTPROCEDURE EVALUATION
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        Post Anesthesia Care and Evaluation    Patient location during evaluation: bedside  Patient participation: complete - patient participated  Level of consciousness: awake  Pain management: adequate    Airway patency: patent  PONV Status: none  Cardiovascular status: acceptable and stable  Respiratory status: acceptable  Hydration status: acceptable

## 2024-10-17 ENCOUNTER — TELEPHONE (OUTPATIENT)
Dept: SURGERY | Facility: CLINIC | Age: 59
End: 2024-10-17
Payer: COMMERCIAL

## 2024-10-17 LAB
CYTO UR: NORMAL
LAB AP CASE REPORT: NORMAL
LAB AP CLINICAL INFORMATION: NORMAL
PATH REPORT.FINAL DX SPEC: NORMAL
PATH REPORT.GROSS SPEC: NORMAL

## 2024-10-17 NOTE — TELEPHONE ENCOUNTER
----- Message from Daljit Issa sent at 10/17/2024 10:20 AM EDT -----  Call patient with results.  1 area of precancerous change, dysplasia.  No cancer.  ----- Message -----  From: Lab, Background User  Sent: 10/17/2024   9:43 AM EDT  To: Daljit Issa MD

## 2024-10-18 ENCOUNTER — TELEPHONE (OUTPATIENT)
Age: 59
End: 2024-10-18
Payer: COMMERCIAL

## 2024-10-18 NOTE — TELEPHONE ENCOUNTER
Contacted pt regarding OD labs, pt is agreeable to complete. Pt requested lab order be sent to [facility] on [address] in [place] faxed to [phone]

## 2024-11-06 ENCOUNTER — TELEPHONE (OUTPATIENT)
Dept: SURGERY | Facility: CLINIC | Age: 59
End: 2024-11-06

## 2024-11-07 ENCOUNTER — OFFICE VISIT (OUTPATIENT)
Dept: SURGERY | Facility: CLINIC | Age: 59
End: 2024-11-07
Payer: COMMERCIAL

## 2024-11-07 VITALS
DIASTOLIC BLOOD PRESSURE: 83 MMHG | HEART RATE: 82 BPM | SYSTOLIC BLOOD PRESSURE: 115 MMHG | BODY MASS INDEX: 24.99 KG/M2 | WEIGHT: 150 LBS | RESPIRATION RATE: 16 BRPM | HEIGHT: 65 IN

## 2024-11-07 DIAGNOSIS — A63.0 ANAL CONDYLOMA: Primary | ICD-10-CM

## 2024-11-07 PROCEDURE — 99024 POSTOP FOLLOW-UP VISIT: CPT | Performed by: SURGERY

## 2024-11-07 NOTE — LETTER
November 21, 2024     KATHY Cardoso  [address] [address]    Patient: Delgado Jerome   YOB: 1965   Date of Visit: 11/7/2024     Dear KATHY Cardoso:       Thank you for referring Delgado Jerome to me for evaluation. Below are the relevant portions of my assessment and plan of care.    If you have questions, please do not hesitate to call me. I look forward to following Delgado along with you.         Sincerely,        Daljit Issa MD        CC: MD Marco A Khoury PA Bailey, Matthew Bruce, MD  11/21/24 1014  Sign when Signing Visit  General Surgery/Colorectal Surgery   Post -op Follow up Note    Patient Name:  Delgado Jerome  YOB: 1965  [id number]    Referring Provider: No ref. provider found    Patient Care Team:  Marco A Thomas PA as PCP - General (Physician Assistant)  Bryson Ayala MD as Consulting Physician (Otolaryngology)    Chief complaint follow-up    Subjective.     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  Pathology with right anal canal low-grade squamous intraepithelial lesion, anterior anal canal normal, posterior anal canal normal, left anal canal normal.    History of anemia undergoing colonoscopy by Dr. Jaffe 9/16/2024 with biopsy of suspected condyloma in the anus with pathology high-grade squamous intraepithelial lesion AIN 2 3    HIV testing negative in 2018.    He comes in for follow-up.  He is doing well.  No fever.  His pain is improving.    History:  Past Medical History:   Diagnosis Date   • Anemia, unspecified     ASYMPTOMATIC   • Arthritis    • Arthritis of carpometacarpal (CMC) joint of left thumb 11/15/2017   • Arthritis of carpometacarpal (CMC) joint of right thumb 01/10/2018   • Blood clot in vein     left leg RESOLVED   • Chronic allergic rhinitis    • Conductive hearing loss, bilateral    • COVID     sept  2024, strep throat   • Dental disease     teeth grinding   • Deviated nasal septum    • ETD (Eustachian tube dysfunction), bilateral    • Foot pain, left 03/28/2019   • GERD (gastroesophageal reflux disease)    • High cholesterol    • Hypertension     NO RX MEDS MONITORING NOW   • Hypertrophy of both inferior nasal turbinates    • Intervertebral disc disease    • Medial epicondylitis of elbow, left 11/14/2017   • Medial epicondylitis of elbow, right 01/10/2018   • Mood disorder    • Haley neuroma, 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   • Nasal obstruction    • Numbness left ankle    • PONV (postoperative nausea and vomiting)    • Tinnitus    • Tinnitus, bilateral    • Urgency of urination 05/15/2017       Past Surgical History:   Procedure Laterality Date   • ANKLE SURGERY Left    • APPENDECTOMY     • COLONOSCOPY     • COLONOSCOPY N/A 09/16/2024    Procedure: COLONOSCOPY with biopsy;  Surgeon: Pipo Jaffe MD;  Location: [facility] ENDOSCOPY;  Service: Gastroenterology;  Laterality: N/A;  anal lesion   • EAR TUBES     • ENDOSCOPY N/A 09/16/2024    Procedure: ESOPHAGOGASTRODUODENOSCOPY with biopsy;  Surgeon: Pipo Jaffe MD;  Location: [facility] ENDOSCOPY;  Service: Gastroenterology;  Laterality: N/A;  hiatal hernia   • KNEE MENISCAL REPAIR Left    • MYRINGOTOMY W/ TUBES Bilateral 01/03/2023    Procedure: MYRINGOTOMY WITH INSERTION OF EAR TUBES;  Surgeon: Bryson Ayala MD;  [place] [facility] [place] [facility]  Service: ENT;  Laterality: Bilateral;   • PERINEAL/ANAL CONDYLOMA EXCISION/FULGURATION N/A 10/16/2024    Procedure: PERINEAL/ANAL CONDYLOMA EXCISION/FULGURATION;  Surgeon: Daljit Issa MD;  [place] [facility] [place] [facility]  Service: General;  Laterality: N/A;   • REPLACEMENT TOTAL KNEE      Right knee       Family History   Problem Relation Age of Onset   • Pancreatic cancer Father    • Cancer Father         pancreatic   • Stroke Maternal Grandmother    • Alcohol abuse Maternal Grandmother    • Heart disease Paternal  Grandmother    • Cancer Brother         Testicular   • Colon cancer Neg Hx    • Malig Hyperthermia Neg Hx        Social History     Tobacco Use   • Smoking status: Never     Passive exposure: Never   • Smokeless tobacco: Never   Vaping Use   • Vaping status: Never Used   Substance Use Topics   • Alcohol use: Not Currently   • Drug use: Never       MEDS:  Prior to Admission medications    Medication Sig Start Date End Date Taking? Authorizing Provider   Azelastine-Fluticasone 137-50 MCG/ACT suspension 2 (Two) Times a Day.   Yes Angelina Malcolm MD   busPIRone (BUSPAR) 15 MG tablet Take 0.5 tablets by mouth 2 (Two) Times a Day.   Yes Angelina Malcolm MD   cyclobenzaprine (FLEXERIL) 5 MG tablet TAKE 1 TO 2 TABLETS BY MOUTH THREE TIMES DAILY AS NEEDED FOR MUSCLE SPASMS 1/25/24  Yes Angelina Malcolm MD   diphenhydrAMINE-acetaminophen (Tylenol PM Extra Strength)  MG tablet per tablet 1 tablet Every Night.   Yes Angelina Malcolm MD   esomeprazole (nexIUM) 20 MG capsule Take 1 capsule by mouth Every Morning Before Breakfast. 8/29/22  Yes Angelina Malcolm MD   fexofenadine (ALLEGRA) 180 MG tablet Take 1 tablet by mouth 2 (Two) Times a Day.   Yes Angelina Malcolm MD   finasteride (PROSCAR) 5 MG tablet finasteride 5 mg oral tablet take 0.5 tablet by oral route daily   Active   Yes Angelina Malcolm MD   folic acid (FOLVITE) 1 MG tablet Take 1 tablet by mouth Daily. 4/14/21  Yes Angelina Malcolm MD   ipratropium (ATROVENT) 0.06 % nasal spray USE 2 SPRAYS IN EACH NOSTRIL EVERY 6 HOURS AS NEEDED 12/21/22  Yes Angelina Malcolm MD   metaxalone (SKELAXIN) 800 MG tablet Take 1 tablet by mouth 2 (Two) Times a Day. 1/12/24  Yes Angelina Malcolm MD   montelukast (SINGULAIR) 10 MG tablet montelukast 10 mg oral tablet take 1 tablet (10 mg) by oral route once daily in the evening   Active   Yes Angelina Malcolm MD   naloxone (NARCAN) 4 MG/0.1ML nasal spray Call 911. Don't prime.  Spray in 1 nostril for overdose. Repeat in 2-3 minutes in other nostril if no or minimal breathing/responsiveness. 10/16/24  Yes Daljit Issa MD   simvastatin (ZOCOR) 40 MG tablet Take 1 tablet by mouth Every Night.   Yes Angelina Malcolm MD   tolterodine LA (DETROL LA) 2 MG 24 hr capsule Take 1 capsule by mouth Daily. 3/30/21  Yes Angelina Malcolm MD   vitamin D3 125 MCG (5000 UT) capsule capsule Take 1 capsule by mouth Daily. 5/2/21  Yes Angelina Malcolm MD   gabapentin (NEURONTIN) 300 MG capsule Take 2 capsules by mouth 2 (Two) Times a Day.  Patient not taking: Reported on 11/7/2024    ProviderAngelina MD             No current facility-administered medications for this visit.       Allergies:  Patient has no known allergies.    Objective    Vital Signs        Physical Exam:     No acute distress, sitting comfortably    Results Review: I have reviewed the patient's labs and imaging    LABS/IMAGING:    Imaging Results (Last 72 Hours)       ** No results found for the last 72 hours. **             Lab Results (last 72 hours)       ** No results found for the last 72 hours. **               Result Review:Labs  Result Review  Imaging  Med Tab  Media    Assessment & Plan    * No active hospital problems. 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  Pathology with right anal canal low-grade squamous intraepithelial lesion, anterior anal canal normal, posterior anal canal normal, left anal canal normal.    History of anemia undergoing colonoscopy by Dr. Jaffe 9/16/2024 with biopsy of suspected condyloma in the anus with pathology high-grade squamous intraepithelial lesion AIN 2 3    HIV testing negative in 2018.    I reviewed the details of the surgery with the patient.  I reviewed the pathology.  Follow-up with me in 6 months.  All questions answered.  He agrees with the plan.  Thank you for the consult.          aDljit Issa MD  11/21/24 09:47  EST

## 2024-11-21 NOTE — PROGRESS NOTES
General Surgery/Colorectal Surgery   Post -op Follow up Note    Patient Name:  Delgado Jerome  YOB: 1965  [id number]    Referring Provider: No ref. provider found    Patient Care Team:  Marco A Thomas PA as PCP - General (Physician Assistant)  Bryson Ayala MD as Consulting Physician (Otolaryngology)    Chief complaint follow-up    Subjective .     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  Pathology with right anal canal low-grade squamous intraepithelial lesion, anterior anal canal normal, posterior anal canal normal, left anal canal normal.    History of anemia undergoing colonoscopy by Dr. Jaffe 9/16/2024 with biopsy of suspected condyloma in the anus with pathology high-grade squamous intraepithelial lesion AIN 2 3    HIV testing negative in 2018.    He comes in for follow-up.  He is doing well.  No fever.  His pain is improving.    History:  Past Medical History:   Diagnosis Date    Anemia, unspecified     ASYMPTOMATIC    Arthritis     Arthritis of carpometacarpal (CMC) joint of left thumb 11/15/2017    Arthritis of carpometacarpal (CMC) joint of right thumb 01/10/2018    Blood clot in vein     left leg RESOLVED    Chronic allergic rhinitis     Conductive hearing loss, bilateral     COVID     sept 2024, strep throat    Dental disease     teeth grinding    Deviated nasal septum     ETD (Eustachian tube dysfunction), bilateral     Foot pain, left 03/28/2019    GERD (gastroesophageal reflux disease)     High cholesterol     Hypertension     NO RX MEDS MONITORING NOW    Hypertrophy of both inferior nasal turbinates     Intervertebral disc disease     Medial epicondylitis of elbow, left 11/14/2017    Medial epicondylitis of elbow, right 01/10/2018    Mood disorder     Haley neuroma, 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    Nasal obstruction     Numbness left ankle     PONV (postoperative nausea and vomiting)     Tinnitus     Tinnitus,  bilateral     Urgency of urination 05/15/2017       Past Surgical History:   Procedure Laterality Date    ANKLE SURGERY Left     APPENDECTOMY      COLONOSCOPY      COLONOSCOPY N/A 09/16/2024    Procedure: COLONOSCOPY with biopsy;  Surgeon: Pipo Jaffe MD;  Location: [facility] ENDOSCOPY;  Service: Gastroenterology;  Laterality: N/A;  anal lesion    EAR TUBES      ENDOSCOPY N/A 09/16/2024    Procedure: ESOPHAGOGASTRODUODENOSCOPY with biopsy;  Surgeon: Pipo Jaffe MD;  Location: [facility] ENDOSCOPY;  Service: Gastroenterology;  Laterality: N/A;  hiatal hernia    KNEE MENISCAL REPAIR Left     MYRINGOTOMY W/ TUBES Bilateral 01/03/2023    Procedure: MYRINGOTOMY WITH INSERTION OF EAR TUBES;  Surgeon: Bryson Ayala MD;  Location: [facility] OR [facility];  Service: ENT;  Laterality: Bilateral;    PERINEAL/ANAL CONDYLOMA EXCISION/FULGURATION N/A 10/16/2024    Procedure: PERINEAL/ANAL CONDYLOMA EXCISION/FULGURATION;  Surgeon: Daljit Issa MD;  Location: [facility] OR [facility];  Service: General;  Laterality: N/A;    REPLACEMENT TOTAL KNEE      Right knee       Family History   Problem Relation Age of Onset    Pancreatic cancer Father     Cancer Father         pancreatic    Stroke Maternal Grandmother     Alcohol abuse Maternal Grandmother     Heart disease Paternal Grandmother     Cancer Brother         Testicular    Colon cancer Neg Hx     Malig Hyperthermia Neg Hx        Social History     Tobacco Use    Smoking status: Never     Passive exposure: Never    Smokeless tobacco: Never   Vaping Use    Vaping status: Never Used   Substance Use Topics    Alcohol use: Not Currently    Drug use: Never       MEDS:  Prior to Admission medications    Medication Sig Start Date End Date Taking? Authorizing Provider   Azelastine-Fluticasone 137-50 MCG/ACT suspension 2 (Two) Times a Day.   Yes Angelina Malcolm MD   busPIRone (BUSPAR) 15 MG tablet Take 0.5 tablets by mouth 2 (Two) Times a Day.   Yes Angelina Malcolm  MD   cyclobenzaprine (FLEXERIL) 5 MG tablet TAKE 1 TO 2 TABLETS BY MOUTH THREE TIMES DAILY AS NEEDED FOR MUSCLE SPASMS 1/25/24  Yes Angelina Malcolm MD   diphenhydrAMINE-acetaminophen (Tylenol PM Extra Strength)  MG tablet per tablet 1 tablet Every Night.   Yes Angelina Malcolm MD   esomeprazole (nexIUM) 20 MG capsule Take 1 capsule by mouth Every Morning Before Breakfast. 8/29/22  Yes Angelina Malcolm MD   fexofenadine (ALLEGRA) 180 MG tablet Take 1 tablet by mouth 2 (Two) Times a Day.   Yes Angelina Malcolm MD   finasteride (PROSCAR) 5 MG tablet finasteride 5 mg oral tablet take 0.5 tablet by oral route daily   Active   Yes Angelina Malcolm MD   folic acid (FOLVITE) 1 MG tablet Take 1 tablet by mouth Daily. 4/14/21  Yes Angelina Malcolm MD   ipratropium (ATROVENT) 0.06 % nasal spray USE 2 SPRAYS IN EACH NOSTRIL EVERY 6 HOURS AS NEEDED 12/21/22  Yes Angelina Malcolm MD   metaxalone (SKELAXIN) 800 MG tablet Take 1 tablet by mouth 2 (Two) Times a Day. 1/12/24  Yes Angelina Malcolm MD   montelukast (SINGULAIR) 10 MG tablet montelukast 10 mg oral tablet take 1 tablet (10 mg) by oral route once daily in the evening   Active   Yes Angelina Malcolm MD   naloxone (NARCAN) 4 MG/0.1ML nasal spray Call 911. Don't prime. [place] in 1 nostril for overdose. Repeat in 2-3 minutes in other nostril if no or minimal breathing/responsiveness. 10/16/24  Yes Daljit Issa MD   simvastatin (ZOCOR) 40 MG tablet Take 1 tablet by mouth Every Night.   Yes Angelina Malcolm MD   tolterodine LA (DETROL LA) 2 MG 24 hr capsule Take 1 capsule by mouth Daily. 3/30/21  Yes Angelina Malcolm MD   vitamin D3 125 MCG (5000 UT) capsule capsule Take 1 capsule by mouth Daily. 5/2/21  Yes Angelina Malcolm MD   gabapentin (NEURONTIN) 300 MG capsule Take 2 capsules by mouth 2 (Two) Times a Day.  Patient not taking: Reported on 11/7/2024    Angelina Malcolm MD             No current  facility-administered medications for this visit.       Allergies:  Patient has no known allergies.    Objective     Vital Signs        Physical Exam:     No acute distress, sitting comfortably    Results Review: I have reviewed the patient's labs and imaging    LABS/IMAGING:    Imaging Results (Last 72 Hours)       ** No results found for the last 72 hours. **             Lab Results (last 72 hours)       ** No results found for the last 72 hours. **               Result Review :Labs  Result Review  Imaging  Med Tab  Media    Assessment & Plan     * No active hospital problems. 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  Pathology with right anal canal low-grade squamous intraepithelial lesion, anterior anal canal normal, posterior anal canal normal, left anal canal normal.    History of anemia undergoing colonoscopy by Dr. Jaffe 9/16/2024 with biopsy of suspected condyloma in the anus with pathology high-grade squamous intraepithelial lesion AIN 2 3    HIV testing negative in 2018.    I reviewed the details of the surgery with the patient.  I reviewed the pathology.  Follow-up with me in 6 months.  All questions answered.  He agrees with the plan.  Thank you for the consult.          Daljit Issa MD  11/21/24 09:47 EST

## 2025-02-10 ENCOUNTER — OFFICE VISIT (OUTPATIENT)
Dept: OTOLARYNGOLOGY | Facility: CLINIC | Age: 60
End: 2025-02-10
Payer: COMMERCIAL

## 2025-02-10 VITALS
OXYGEN SATURATION: 100 % | SYSTOLIC BLOOD PRESSURE: 130 MMHG | HEART RATE: 76 BPM | TEMPERATURE: 97 F | DIASTOLIC BLOOD PRESSURE: 91 MMHG

## 2025-02-10 DIAGNOSIS — H65.23 CHRONIC SEROUS OTITIS MEDIA OF BOTH EARS: Primary | ICD-10-CM

## 2025-02-10 DIAGNOSIS — H90.3 SENSORY HEARING LOSS, BILATERAL: ICD-10-CM

## 2025-02-10 DIAGNOSIS — R51.9 HEADACHE DISORDER: ICD-10-CM

## 2025-02-10 DIAGNOSIS — H93.13 TINNITUS OF BOTH EARS: ICD-10-CM

## 2025-02-10 RX ORDER — TAMSULOSIN HYDROCHLORIDE 0.4 MG/1
1 CAPSULE ORAL EVERY 24 HOURS
COMMUNITY

## 2025-02-10 RX ORDER — RIZATRIPTAN BENZOATE 10 MG/1
TABLET, ORALLY DISINTEGRATING ORAL
COMMUNITY

## 2025-02-10 RX ORDER — FAMOTIDINE 20 MG/1
20 TABLET, FILM COATED ORAL DAILY
COMMUNITY
Start: 2024-11-29

## 2025-02-10 RX ORDER — BUTALBITAL, ACETAMINOPHEN AND CAFFEINE 50; 325; 40 MG/1; MG/1; MG/1
TABLET ORAL
COMMUNITY
Start: 2025-01-27

## 2025-02-10 RX ORDER — AZELASTINE HYDROCHLORIDE 137 UG/1
SPRAY, METERED NASAL EVERY 12 HOURS SCHEDULED
COMMUNITY

## 2025-02-10 NOTE — PROGRESS NOTES
Patient Name: Delgado Jerome   Visit Date: 02/10/2025   Patient ID: [id number]  Provider: Bryson Ayala MD    Sex: male  Location: [facility]   YOB: 1965  Location Address: [address] [place] [address], [address],?[place]?[id number]    Primary Care Provider Marco A Thomas PA  Location Phone: [phone]    Referring Provider: No ref. provider found        Chief Complaint  1 year follow up    History of Present Illness  Delgado Jerome is a 59 y.o. male who presents to [facility] today for follow-up of bilateral conductive hearing loss, left ear pressure, and bilateral eustachian tube dysfunction.  He was originally seen on 2/15/2021 at which time he reported bilateral high-pitched tinnitus since childhood and nasal congestion for which he had been using Dymista, Singulair, fexofenadine, and saline irrigations with some improvement.  Examination that day revealed a slight left septal deviation and prominent maxillary crest bilaterally as well as inferior turbinate hypertrophy.  Audiogram on 4/5/2021 revealed right normal downsloping to mild to moderate conductive loss and left mild rising to normal downsloping to mild rising to normal downsloping to moderate conductive loss.  SRT was 10 on the right and 15 on the left.  Speech discrimination was 96% on the right and 90% on the left at 60 dB.  Tympanograms are type C bilaterally. Audiogram on 6/14/2021 revealed bilateral normal downsloping to mild to severe high-frequency sensorineural hearing loss on the right and mild high-frequency sensorineural hearing loss on the left.  SRT is 15 bilaterally.  Speech discrimination is 100% bilaterally at 50 dB.  Tympanograms are type B with expanded volumes. Audiogram on 5/5/2023 at [facility] revealed right normal downsloping to mild to moderate sensorineural hearing loss and left mild rising to normal downsloping to mild to moderate sensorineural  hearing loss.  SRT was 15 bilaterally speech discrimination was 100% on the right and 96% on the left at 55 dB.    He returns today for follow-up status post bilateral myringotomy and tube placement in clinic on 4/28/2021 along with bilateral myringotomy and T-tube placement on 1/3/2023.  He was last seen on 2/19/2024 at which time there was mild crusting around the barrels of the T tubes.  Audiogram on 10/23/2024 revealed left normal downsloping to mild to moderate sensorineural hearing loss and right normal downsloping to mild to moderate mixed loss.  SRT was 15 bilaterally.  Word recognition was 96% on the right and 92% on the left at 60 dB.  He tells me that his years have been doing well.  He has not experienced any otalgia or otorrhea.  He feels as though his hearing is stable and is using his hearing aids.  He does report that he was recently diagnosed with migraines which are often occipital and retro-orbital.  They are occasionally associated with photophobia and dizziness.  He is currently on rizatriptan as needed.  MRI of his brain with and without contrast on 11/20/2024 at [facility] revealed clear mastoids and sinuses according to the read.    Past Medical History:   Diagnosis Date    Anemia, unspecified     ASYMPTOMATIC    Arthritis     Arthritis of carpometacarpal (CMC) joint of left thumb 11/15/2017    Arthritis of carpometacarpal (CMC) joint of right thumb 01/10/2018    Blood clot in vein     left leg RESOLVED    Chronic allergic rhinitis     Conductive hearing loss, bilateral     COVID     sept 2024, strep throat    Dental disease     teeth grinding    Deviated nasal septum     ETD (Eustachian tube dysfunction), bilateral     Foot pain, left 03/28/2019    GERD (gastroesophageal reflux disease)     Headache Diagnosed fall 24    High cholesterol     Hypertension     NO RX MEDS MONITORING NOW    Hypertrophy of both inferior nasal turbinates     Intervertebral disc disease     Medial epicondylitis of  elbow, left 11/14/2017    Medial epicondylitis of elbow, right 01/10/2018    Mood disorder     Haley neuroma, 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    Nasal obstruction     Numbness left ankle     PONV (postoperative nausea and vomiting)     Tinnitus     Tinnitus, bilateral     Urgency of urination 05/15/2017       Past Surgical History:   Procedure Laterality Date    ANKLE SURGERY Left     APPENDECTOMY      COLONOSCOPY      COLONOSCOPY N/A 09/16/2024    Procedure: COLONOSCOPY with biopsy;  Surgeon: Pipo Jaffe MD;  Location: [facility];  Service: Gastroenterology;  Laterality: N/A;  anal lesion    EAR TUBES      ENDOSCOPY N/A 09/16/2024    Procedure: ESOPHAGOGASTRODUODENOSCOPY with biopsy;  Surgeon: Pipo Jaffe MD;  Location: [facility];  Service: Gastroenterology;  Laterality: N/A;  hiatal hernia    KNEE MENISCAL REPAIR Left     MYRINGOTOMY W/ TUBES Bilateral 01/03/2023    Procedure: MYRINGOTOMY WITH INSERTION OF EAR TUBES;  Surgeon: Bryson Ayala MD;  Location: [facility] OR [facility];  Service: ENT;  Laterality: Bilateral;    PERINEAL/ANAL CONDYLOMA EXCISION/FULGURATION N/A 10/16/2024    Procedure: PERINEAL/ANAL CONDYLOMA EXCISION/FULGURATION;  Surgeon: Daljit Issa MD;  Location: [facility];  Service: General;  Laterality: N/A;    REPLACEMENT TOTAL KNEE      Right knee         Current Outpatient Medications:     Azelastine HCl 137 MCG/SPRAY solution, Every 12 (Twelve) Hours., Disp: , Rfl:     busPIRone (BUSPAR) 15 MG tablet, Take 0.5 tablets by mouth 2 (Two) Times a Day., Disp: , Rfl:     butalbital-acetaminophen-caffeine (FIORICET, ESGIC) -40 MG per tablet, TAKE 2 TABLETS BY MOUTH EVERY 6 HOURS AS NEEDED FOR PAIN OR HEADACHE. MAX 6 PILLS A DAY. MAX DAILY AMOUNT: 8 TABLETS, Disp: , Rfl:     diphenhydrAMINE-acetaminophen (Tylenol PM Extra Strength)  MG tablet per tablet, 1 tablet Every Night., Disp: , Rfl:     esomeprazole (nexIUM) 20 MG capsule, Take 1 capsule by mouth  Every Morning Before Breakfast., Disp: , Rfl:     famotidine (PEPCID) 20 MG tablet, Take 1 tablet by mouth Daily., Disp: , Rfl:     fexofenadine (ALLEGRA) 180 MG tablet, Take 1 tablet by mouth 2 (Two) Times a Day., Disp: , Rfl:     finasteride (PROSCAR) 5 MG tablet, finasteride 5 mg oral tablet take 0.5 tablet by oral route daily   Active, Disp: , Rfl:     folic acid (FOLVITE) 1 MG tablet, Take 1 tablet by mouth Daily., Disp: , Rfl:     gabapentin (NEURONTIN) 300 MG capsule, Take 2 capsules by mouth 2 (Two) Times a Day., Disp: , Rfl:     metaxalone (SKELAXIN) 800 MG tablet, Take 1 tablet by mouth 2 (Two) Times a Day., Disp: , Rfl:     montelukast (SINGULAIR) 10 MG tablet, montelukast 10 mg oral tablet take 1 tablet (10 mg) by oral route once daily in the evening   Active, Disp: , Rfl:     simvastatin (ZOCOR) 40 MG tablet, Take 1 tablet by mouth Every Night., Disp: , Rfl:     tamsulosin (FLOMAX) 0.4 MG capsule 24 hr capsule, 1 capsule Daily., Disp: , Rfl:     tolterodine LA (DETROL LA) 2 MG 24 hr capsule, Take 1 capsule by mouth Daily., Disp: , Rfl:     vitamin D3 125 MCG (5000 UT) capsule capsule, Take 1 capsule by mouth Daily., Disp: , Rfl:     Azelastine-Fluticasone 137-50 MCG/ACT suspension, 2 (Two) Times a Day., Disp: , Rfl:     cyclobenzaprine (FLEXERIL) 5 MG tablet, TAKE 1 TO 2 TABLETS BY MOUTH THREE TIMES DAILY AS NEEDED FOR MUSCLE SPASMS, Disp: , Rfl:     ipratropium (ATROVENT) 0.06 % nasal spray, USE 2 SPRAYS IN EACH NOSTRIL EVERY 6 HOURS AS NEEDED, Disp: , Rfl:     rizatriptan MLT (MAXALT-MLT) 10 MG disintegrating tablet, DISSOLVE 1 TABLET ON THE TONGUE 1 TIME AS NEEDED FOR MIGRAINE. MAY REPEAT 1 TIME IN 2 HOURS -. MAX 30 MG / 24 HOURS, Disp: , Rfl:      No Known Allergies    Social History     Tobacco Use    Smoking status: Never     Passive exposure: Never    Smokeless tobacco: Never   Vaping Use    Vaping status: Never Used   Substance Use Topics    Alcohol use: Not Currently    Drug use: Never         Objective     Vital Signs:   /91   Pulse 76   Temp 97 °F (36.1 °C)   SpO2 100%       Physical Exam    General: Well developed, well nourished patient of stated age in no acute distress. Voice is strong and clear.   Head: Normocephalic and atraumatic.              Face: No lesions. House-Brackmann I/VI bilaterally.   muscles and temporomandibular joint nontender to palpation.  No TMJ crepitus.  Eyes: PERRLA, sclerae anicteric, no conjunctival injection. Extra ocular movements are intact and full. No nystagmus.   Ears: Auricles are normal in appearance. Bilateral external auditory canals are unremarkable.  Bilateral tympanic membranes with T tubes in place and patent.  No otorrhea.  Hearing normal to conversational voice.              Nose: External nose is normal in appearance. Bilateral nares are patent with normal appearing mucosa. Septum deviated to the left. Turbinates are  hypertrophied. No lesions.   Oral Cavity: Lips are normal in appearance. Oral mucosa is unremarkable. Gingiva is unremarkable. Normal dentition for age. Tongue is unremarkable with good movement. Hard palate is unremarkable.              Oropharynx: Soft palate is unremarkable with full movement. Uvula is unremarkable. Bilateral tonsils are unremarkable. Posterior oropharynx is unremarkable.               Larynx and hypopharynx: Deferred secondary to gag reflex.  Neck: Supple.  No mass.  Nontender to palpation.  Trachea midline. Thyroid normal size and without nodules to palpation.              Lymphatic: No lymphadenopathy upon palpation.              Psychiatric: Appropriate affect, cooperative              Neurologic: Oriented x 3, strength symmetric in all extremities, Cranial Nerves II-XII are grossly intact to confrontation              Skin: Warm and dry. No rashes.     Procedures     Result Review :               Assessment and Plan    Diagnoses and all orders for this visit:   Diagnosis Plan   1. Chronic serous  otitis media of both ears        2. Sensory hearing loss, bilateral        3. Tinnitus of both ears        4. Headache disorder              Impressions and findings were discussed at great length.  Currently, he is doing well from an otologic standpoint and both T tubes are in place and patent.  In regards to his migraines we discussed the pathophysiology and natural history of this condition.  He is currently keeping a headache diary and was encouraged to continue to do so.  We discussed typical triggers and supplemental management.  He was given ample time to ask questions, all of which were answered to his satisfaction.    Follow Up   No follow-ups on file.  Patient was given instructions and counseling regarding his condition or for health maintenance advice. Please see specific information pulled into the AVS if appropriate.

## 2025-05-07 ENCOUNTER — OFFICE VISIT (OUTPATIENT)
Dept: SURGERY | Facility: CLINIC | Age: 60
End: 2025-05-07
Payer: COMMERCIAL

## 2025-05-07 VITALS — RESPIRATION RATE: 16 BRPM | WEIGHT: 146 LBS | HEIGHT: 65 IN | BODY MASS INDEX: 24.32 KG/M2

## 2025-05-07 DIAGNOSIS — A63.0 ANAL CONDYLOMA: Primary | ICD-10-CM

## 2025-05-07 RX ORDER — SILDENAFIL 100 MG/1
50-100 TABLET, FILM COATED ORAL
COMMUNITY
Start: 2025-04-14 | End: 2026-04-14

## 2025-05-07 RX ORDER — PROPRANOLOL HYDROCHLORIDE 60 MG/1
60 CAPSULE, EXTENDED RELEASE ORAL DAILY
COMMUNITY
Start: 2025-05-02

## 2025-05-07 RX ORDER — BUTALBITAL, ASPIRIN, AND CAFFEINE 325; 50; 40 MG/1; MG/1; MG/1
CAPSULE ORAL
COMMUNITY

## 2025-05-07 RX ORDER — TOPIRAMATE 25 MG/1
25 TABLET, FILM COATED ORAL
COMMUNITY
Start: 2025-02-27 | End: 2025-05-28

## 2025-05-07 RX ORDER — AZITHROMYCIN 250 MG/1
TABLET, FILM COATED ORAL
COMMUNITY
Start: 2025-02-18

## 2025-05-07 RX ORDER — MELOXICAM 7.5 MG/1
7.5 TABLET ORAL DAILY
COMMUNITY
Start: 2025-03-24 | End: 2026-03-24

## 2025-05-07 NOTE — LETTER
May 9, 2025     VI Dejesus  [address]  [place] [address]    Patient: Delgado Jerome   YOB: 1965   Date of Visit: 5/7/2025     Dear VI Dejesus:       Thank you for referring Delgado Jerome to me for evaluation. Below are the relevant portions of my assessment and plan of care.    If you have questions, please do not hesitate to call me. I look forward to following Delgado along with you.         Sincerely,        Daljit Issa MD        CC: KATHY Cardoso MD Bailey, Daljit Sunshine MD  05/09/25 0722  Sign when Signing Visit  General Surgery/Colorectal Surgery Note    Patient Name:  Delgado Jerome  YOB: 1965  [id number]    Referring Provider: No ref. provider found      Patient Care Team:  Marco A Thomas PA as PCP - General (Physician Assistant)  Bryson Ayala MD as Consulting Physician (Otolaryngology)    Chief complaint follow-up    Subjective.     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  Pathology with right anal canal low-grade squamous intraepithelial lesion, anterior anal canal normal, posterior anal canal normal, left anal canal normal.    History of anemia undergoing colonoscopy by Dr. Jaffe 9/16/2024 with biopsy of suspected condyloma in the anus with pathology high-grade squamous intraepithelial lesion AIN 2 3    HIV testing negative in 2018.    He comes in for follow-up.  No changes in health or medications since last seen.  No blood or mucus per rectum      History:  Past Medical History:   Diagnosis Date   • Anemia, unspecified     ASYMPTOMATIC   • Arthritis    • Arthritis of carpometacarpal (CMC) joint of left thumb 11/15/2017   • Arthritis of carpometacarpal (CMC) joint of right thumb 01/10/2018   • Blood clot in vein     left leg RESOLVED   • Chronic allergic rhinitis    • Conductive hearing loss, bilateral    • COVID      sept 2024, strep throat   • Dental disease     teeth grinding   • Deviated nasal septum    • ETD (Eustachian tube dysfunction), bilateral    • Fissure, anal    • Foot pain, left 03/28/2019   • GERD (gastroesophageal reflux disease)    • Headache Diagnosed fall 24   • High cholesterol    • Hypertension     NO RX MEDS MONITORING NOW   • Hypertrophy of both inferior nasal turbinates    • Intervertebral disc disease    • Medial epicondylitis of elbow, left 11/14/2017   • Medial epicondylitis of elbow, right 01/10/2018   • Mood disorder    • Haley neuroma, 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   • Nasal obstruction    • Numbness left ankle    • PONV (postoperative nausea and vomiting)    • Tinnitus    • Tinnitus, bilateral    • Urgency of urination 05/15/2017       Past Surgical History:   Procedure Laterality Date   • ANKLE SURGERY Left    • APPENDECTOMY     • COLONOSCOPY     • COLONOSCOPY N/A 09/16/2024    Procedure: COLONOSCOPY with biopsy;  Surgeon: Pipo Jaffe MD;  Location: [facility] ENDOSCOPY;  Service: Gastroenterology;  Laterality: N/A;  anal lesion   • EAR TUBES     • ENDOSCOPY N/A 09/16/2024    Procedure: ESOPHAGOGASTRODUODENOSCOPY with biopsy;  Surgeon: Pipo Jaffe MD;  Location: [facility] ENDOSCOPY;  Service: Gastroenterology;  Laterality: N/A;  hiatal hernia   • KNEE MENISCAL REPAIR Left    • MYRINGOTOMY W/ TUBES Bilateral 01/03/2023    Procedure: MYRINGOTOMY WITH INSERTION OF EAR TUBES;  Surgeon: Bryson Ayala MD;  [place] [facility] [place] [facility] [place]  Service: [place];  Laterality: Bilateral;   • PERINEAL/ANAL CONDYLOMA EXCISION/FULGURATION N/A 10/16/2024    Procedure: PERINEAL/ANAL CONDYLOMA EXCISION/FULGURATION;  Surgeon: Daljit Issa MD;  [place] [facility] [place] [facility] [place]  Service: General;  Laterality: N/A;   • REPLACEMENT TOTAL KNEE      Right knee       Family History   Problem Relation Age of Onset   • Pancreatic cancer Father    • Cancer Father         pancreatic   • Stroke Maternal Grandmother    • Alcohol  abuse Maternal Grandmother    • Heart disease Paternal Grandmother    • Cancer Brother         Testicular   • Migraines Brother    • Colon cancer Neg Hx    • Malig Hyperthermia Neg Hx        Social History     Tobacco Use   • Smoking status: Never     Passive exposure: Never   • Smokeless tobacco: Never   Vaping Use   • Vaping status: Never Used   Substance Use Topics   • Alcohol use: Not Currently   • Drug use: Never       Review of Systems  All systems were reviewed and negative except for:   Review of Systems   Constitutional: Negative for chills, fever and unexpected weight loss.   HENT: Negative for congestion, nosebleeds and voice change.    Eyes: Negative for blurred vision, double vision and discharge.   Respiratory: Negative for apnea, chest tightness and shortness of breath.    Cardiovascular: Negative for chest pain and leg swelling.   Gastrointestinal:        See HPI   Endocrine: Negative for cold intolerance and heat intolerance.   Genitourinary: Negative for dysuria, hematuria and urgency.   Musculoskeletal: Negative for back pain, joint swelling and neck pain.   Skin: Negative for color change and dry skin.   Neurological: Negative for dizziness and confusion.   Hematological: Negative for adenopathy.   Psychiatric/Behavioral: Negative for agitation and behavioral problems.     MEDS:  Prior to Admission medications    Medication Sig Start Date End Date Taking? Authorizing Provider   Azelastine HCl 137 MCG/SPRAY solution Every 12 (Twelve) Hours.   Yes Angelina Malcolm MD   Azelastine-Fluticasone 137-50 MCG/ACT suspension 2 (Two) Times a Day.   Yes Angelina Malcolm MD   azithromycin (ZITHROMAX) 250 MG tablet  2/18/25  Yes Angelina Malcolm MD   busPIRone (BUSPAR) 15 MG tablet Take 0.5 tablets by mouth 2 (Two) Times a Day.   Yes Angelina Malcolm MD   butalbital-acetaminophen-caffeine (FIORICET, ESGIC) -40 MG per tablet TAKE 2 TABLETS BY MOUTH EVERY 6 HOURS AS NEEDED FOR PAIN OR  HEADACHE. MAX 6 PILLS A DAY. MAX DAILY AMOUNT: 8 TABLETS 1/27/25  Yes Angelina Malcolm MD   butalbital-aspirin-caffeine (FIORINAL) -40 MG capsule Every 4 (Four) Hours.   Yes Angelina Malcolm MD   cyclobenzaprine (FLEXERIL) 5 MG tablet TAKE 1 TO 2 TABLETS BY MOUTH THREE TIMES DAILY AS NEEDED FOR MUSCLE SPASMS 1/25/24  Yes Angelina Malcolm MD   diphenhydrAMINE-acetaminophen (Tylenol PM Extra Strength)  MG tablet per tablet 1 tablet Every Night.   Yes Angelina Malcolm MD   esomeprazole (nexIUM) 20 MG capsule Take 1 capsule by mouth Every Morning Before Breakfast. 8/29/22  Yes Angelina Malcolm MD   famotidine (PEPCID) 20 MG tablet Take 1 tablet by mouth Daily. 11/29/24  Yes Angelina Malcolm MD   fexofenadine (ALLEGRA) 180 MG tablet Take 1 tablet by mouth 2 (Two) Times a Day.   Yes Angelina Malcolm MD   finasteride (PROSCAR) 5 MG tablet finasteride 5 mg oral tablet take 0.5 tablet by oral route daily   Active   Yes ProviderAngelina MD   folic acid (FOLVITE) 1 MG tablet Take 1 tablet by mouth Daily. 4/14/21  Yes Angelina Malcolm MD   gabapentin (NEURONTIN) 300 MG capsule Take 2 capsules by mouth 2 (Two) Times a Day.   Yes ProviderAngelina MD   ipratropium (ATROVENT) 0.06 % nasal spray USE 2 SPRAYS IN EACH NOSTRIL EVERY 6 HOURS AS NEEDED 12/21/22  Yes ProviderAngelina MD   meloxicam (MOBIC) 7.5 MG tablet Take 1 tablet by mouth Daily. 3/24/25 3/24/26 Yes ProviderAngelina MD   metaxalone (SKELAXIN) 800 MG tablet Take 1 tablet by mouth 2 (Two) Times a Day. 1/12/24  Yes Angelina Malcolm MD   montelukast (SINGULAIR) 10 MG tablet montelukast 10 mg oral tablet take 1 tablet (10 mg) by oral route once daily in the evening   Active   Yes ProviderAngelina MD   propranolol LA (INDERAL LA) 60 MG 24 hr capsule Take 1 capsule by mouth Daily. 5/2/25  Yes Angelina Malcolm MD   sildenafil (VIAGRA) 100 MG tablet Take 0.5-1 tablets by mouth. 4/14/25  4/14/26 Yes Provider, MD Angelina   tamsulosin (FLOMAX) 0.4 MG capsule 24 hr capsule 1 capsule Daily.   Yes Provider, MD Angelina   tolterodine LA (DETROL LA) 2 MG 24 hr capsule Take 1 capsule by mouth Daily. 3/30/21  Yes Angelina Malcolm MD   topiramate (TOPAMAX) 25 MG tablet Take 1 tablet by mouth. 2/27/25 5/28/25 Yes Angelina Malcolm MD   ubrogepant (UBRELVY) 100 MG tablet Take 1 tablet by mouth. 5/2/25  Yes ProviderAngelina MD   vitamin D3 125 MCG (5000 UT) capsule capsule Take 1 capsule by mouth Daily. 5/2/21  Yes Provider, MD Angelina        Allergies:  Patient has no known allergies.    Objective    Vital Signs        Physical Exam:     General Appearance:    Alert, cooperative, in no acute distress   Head:    Normocephalic, without obvious abnormality, atraumatic   Eyes:          Conjunctivae and sclerae normal, no icterus,     Ears:    Ears appear intact with no abnormalities noted   Throat:   No oral lesions, no thrush, oral mucosa moist   Neck:   No adenopathy, supple, trachea midline, no thyromegaly   Back:     No kyphosis present, no scoliosis present, no skin lesions,      erythema or scars, no tenderness to percussion or                   palpation,   range of motion normal   Lungs:     Clear to auscultation,respirations regular, even and                  unlabored    Heart:    Regular rhythm and normal rate, normal S1 and S2, no            murmur, no gallop, no rub, no click   Chest Wall:    No abnormalities observed   Abdomen:     Normal bowel sounds, no masses, no organomegaly, soft        non-tender, non-distended, no guarding, no rebound                tenderness   Rectal:   No external condyloma   Extremities:   Moves all extremities well, no edema, no cyanosis, no             redness   Pulses:   Pulses palpable and equal bilaterally   Skin:   No bleeding, bruising or rash   Lymph nodes:   No palpable adenopathy   Neurologic:   A/o x 4 with no deficits       Results  "Review:   {Results Review:36118::\"I reviewed the patient's new clinical results.\"    LABS/IMAGING:  Results for orders placed or performed during the hospital encounter of 10/16/24   Tissue Pathology Exam    Collection Time: 10/16/24 11:27 AM    Specimen: A: Anus; Tissue    B: Anus; Tissue    C: Anus; Tissue    D: Anus; Tissue   Result Value Ref Range    Case Report       Surgical Pathology Report                         Case: [id number]                                  Authorizing Provider:  Daljit Issa MD  Collected:           10/16/2024 11:27 AM          Ordering Location:     [facility]  Received:            10/16/2024 01:35 PM                                 OR                                                                           Pathologist:           Ned Tinoco MD                                                            Specimens:   1) - Anus, right anal canal                                                                         2) - Anus, Anterior anal canal                                                                      3) - Anus, posterior anal canal                                                                     4) - Anus, left anal canal                                                                 Clinical Information       Anal condyloma      Final Diagnosis       1. Right anal canal, biopsy:   - Low grade squamous intraepithelial lesion (AIN 1)      2. Anterior anal canal, biopsy:   - Squamous mucosa with no significant pathologic change   - Negative for dysplasia and malignancy      3. Posterior anal canal, biopsy:   - Squamocolumnar mucosa with no significant pathologic change   - Negative for dysplasia and malignancy      4. Left anal canal, biopsy:   - Squamocolumnar mucosa with no significant pathologic change   - Negative for dysplasia and malignancy      Gross Description       1. Anus.  Received in formalin and labeled \" right anal canal\" is a " "0.4 cm fragment of tan soft tissue. The specimen is entirely submitted in one cassette.    2. Anus.  Received in formalin and labeled \" anterior anal canal\" is a 0.6 cm fragment of tan soft tissue. The specimen is entirely submitted in one cassette.    3. Anus.  Received in formalin and labeled \" posterior anal canal\" is a 0.4 cm fragment of tan soft tissue. The specimen is entirely submitted in one cassette.    4. Anus.  Received in formalin and labeled \" left anal canal\" is a 0.4 cm fragment of tan soft tissue. The specimen is entirely submitted in one cassette.   DIOGENES      Microscopic Description       Microscopic examination performed.          Result Review: Labs  Result Review  Imaging  Med Tab  Media     Assessment & Plan    Status post excision fulguration extensive anal condyloma 10/16/2024.  Pathology with right anal canal low-grade squamous intraepithelial lesion, anterior anal canal normal, posterior anal canal normal, left anal canal normal.    History of anemia undergoing colonoscopy by Dr. Jaffe 9/16/2024 with biopsy of suspected condyloma in the anus with pathology high-grade squamous intraepithelial lesion AIN 2 3    HIV testing negative in 2018.    No evidence of recurrence of anal condyloma    I recommended a anoscopy in the office today for surveillance.  Benefits and alternatives discussed.  Risk procedure including bleeding and pain discussed.  Consent obtained.  He tolerated the procedure well.  No evidence of recurrence.  Follow-up with me in 6 months.  All questions answered.  He agrees with the plan.  Thank you for the consult.    Procedure note  Preoperative and postoperative diagnosis history of anal condyloma with high-grade dysplasia, procedure anoscopy, Surgeon Maegan, estimated blood loss none, complications none, findings no evidence of recurrence of condyloma              This document has been electronically signed by Daljit Issa MD  May 9, 2025 07:19 EDT  "

## 2025-05-09 NOTE — PROGRESS NOTES
General Surgery/Colorectal Surgery Note    Patient Name:  Delgado Jerome  YOB: 1965  [id number]    Referring Provider: No ref. provider found      Patient Care Team:  Marco A Thomas PA as PCP - General (Physician Assistant)  Bryson Ayala MD as Consulting Physician (Otolaryngology)    Chief complaint follow-up    Subjective .     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  Pathology with right anal canal low-grade squamous intraepithelial lesion, anterior anal canal normal, posterior anal canal normal, left anal canal normal.    History of anemia undergoing colonoscopy by Dr. Jaffe 9/16/2024 with biopsy of suspected condyloma in the anus with pathology high-grade squamous intraepithelial lesion AIN 2 3    HIV testing negative in 2018.    He comes in for follow-up.  No changes in health or medications since last seen.  No blood or mucus per rectum      History:  Past Medical History:   Diagnosis Date    Anemia, unspecified     ASYMPTOMATIC    Arthritis     Arthritis of carpometacarpal (CMC) joint of left thumb 11/15/2017    Arthritis of carpometacarpal (CMC) joint of right thumb 01/10/2018    Blood clot in vein     left leg RESOLVED    Chronic allergic rhinitis     Conductive hearing loss, bilateral     COVID     sept 2024, strep throat    Dental disease     teeth grinding    Deviated nasal septum     ETD (Eustachian tube dysfunction), bilateral     Fissure, anal     Foot pain, left 03/28/2019    GERD (gastroesophageal reflux disease)     Headache Diagnosed fall 24    High cholesterol     Hypertension     NO RX MEDS MONITORING NOW    Hypertrophy of both inferior nasal turbinates     Intervertebral disc disease     Medial epicondylitis of elbow, left 11/14/2017    Medial epicondylitis of elbow, right 01/10/2018    Mood disorder     Haley neuroma, 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    Nasal obstruction     Numbness left ankle     PONV  (postoperative nausea and vomiting)     Tinnitus     Tinnitus, bilateral     Urgency of urination 05/15/2017       Past Surgical History:   Procedure Laterality Date    ANKLE SURGERY Left     APPENDECTOMY      COLONOSCOPY      COLONOSCOPY N/A 09/16/2024    Procedure: COLONOSCOPY with biopsy;  Surgeon: Pipo Jaffe MD;  Location: [facility] ENDOSCOPY;  Service: Gastroenterology;  Laterality: N/A;  anal lesion    EAR TUBES      ENDOSCOPY N/A 09/16/2024    Procedure: ESOPHAGOGASTRODUODENOSCOPY with biopsy;  Surgeon: Pipo Jaffe MD;  Location: [facility] ENDOSCOPY;  Service: Gastroenterology;  Laterality: N/A;  hiatal hernia    KNEE MENISCAL REPAIR Left     MYRINGOTOMY W/ TUBES Bilateral 01/03/2023    Procedure: MYRINGOTOMY WITH INSERTION OF EAR TUBES;  Surgeon: Bryson Ayala MD;  [place] [facility] [place] [facility] [place]  Service: ENT;  Laterality: Bilateral;    PERINEAL/ANAL CONDYLOMA EXCISION/FULGURATION N/A 10/16/2024    Procedure: PERINEAL/ANAL CONDYLOMA EXCISION/FULGURATION;  Surgeon: Dalijt Issa MD;  [place]: [facility] [place] [facility] [place];  Service: General;  Laterality: N/A;    REPLACEMENT TOTAL KNEE      Right knee       Family History   Problem Relation Age of Onset    Pancreatic cancer Father     Cancer Father         pancreatic    Stroke Maternal Grandmother     Alcohol abuse Maternal Grandmother     Heart disease Paternal Grandmother     Cancer Brother         Testicular    Migraines Brother     Colon cancer Neg Hx     Malig Hyperthermia Neg Hx        Social History     Tobacco Use    Smoking status: Never     Passive exposure: Never    Smokeless tobacco: Never   Vaping Use    Vaping status: Never Used   Substance Use Topics    Alcohol use: Not Currently    Drug use: Never       Review of Systems  All systems were reviewed and negative except for:   Review of Systems   Constitutional: Negative for chills, fever and unexpected weight loss.   HENT: Negative for congestion, nosebleeds and voice change.     Eyes: Negative for blurred vision, double vision and discharge.   Respiratory: Negative for apnea, chest tightness and shortness of breath.    Cardiovascular: Negative for chest pain and leg swelling.   Gastrointestinal:        See HPI   Endocrine: Negative for cold intolerance and heat intolerance.   Genitourinary: Negative for dysuria, hematuria and urgency.   Musculoskeletal: Negative for back pain, joint swelling and neck pain.   Skin: Negative for color change and dry skin.   Neurological: Negative for dizziness and confusion.   Hematological: Negative for adenopathy.   Psychiatric/Behavioral: Negative for agitation and behavioral problems.     MEDS:  Prior to Admission medications    Medication Sig Start Date End Date Taking? Authorizing Provider   Azelastine HCl 137 MCG/SPRAY solution Every 12 (Twelve) Hours.   Yes Angelina Malcolm MD   Azelastine-Fluticasone 137-50 MCG/ACT suspension 2 (Two) Times a Day.   Yes Angelina Malcolm MD   azithromycin (ZITHROMAX) 250 MG tablet  2/18/25  Yes Angelina Malcolm MD   busPIRone (BUSPAR) 15 MG tablet Take 0.5 tablets by mouth 2 (Two) Times a Day.   Yes Provider, Historical, MD   butalbital-acetaminophen-caffeine (FIORICET, ESGIC) -40 MG per tablet TAKE 2 TABLETS BY MOUTH EVERY 6 HOURS AS NEEDED FOR PAIN OR HEADACHE. MAX 6 PILLS A DAY. MAX DAILY AMOUNT: 8 TABLETS 1/27/25  Yes Provider, Historical, MD   butalbital-aspirin-caffeine (FIORINAL) -40 MG capsule Every 4 (Four) Hours.   Yes Angelina Malcolm MD   cyclobenzaprine (FLEXERIL) 5 MG tablet TAKE 1 TO 2 TABLETS BY MOUTH THREE TIMES DAILY AS NEEDED FOR MUSCLE SPASMS 1/25/24  Yes Angelina Malcolm MD   diphenhydrAMINE-acetaminophen (Tylenol PM Extra Strength)  MG tablet per tablet 1 tablet Every Night.   Yes Angelina Malcolm MD   esomeprazole (nexIUM) 20 MG capsule Take 1 capsule by mouth Every Morning Before Breakfast. 8/29/22  Yes Angelina Malcolm MD   famotidine  (PEPCID) 20 MG tablet Take 1 tablet by mouth Daily. 11/29/24  Yes Provider, MD Angelina   fexofenadine (ALLEGRA) 180 MG tablet Take 1 tablet by mouth 2 (Two) Times a Day.   Yes Provider, MD Angelina   finasteride (PROSCAR) 5 MG tablet finasteride 5 mg oral tablet take 0.5 tablet by oral route daily   Active   Yes Provider, MD Angelina   folic acid (FOLVITE) 1 MG tablet Take 1 tablet by mouth Daily. 4/14/21  Yes Provider, MD Angelina   gabapentin (NEURONTIN) 300 MG capsule Take 2 capsules by mouth 2 (Two) Times a Day.   Yes Provider, Historical, MD   ipratropium (ATROVENT) 0.06 % nasal spray USE 2 SPRAYS IN EACH NOSTRIL EVERY 6 HOURS AS NEEDED 12/21/22  Yes Provider, MD Angelina   meloxicam (MOBIC) 7.5 MG tablet Take 1 tablet by mouth Daily. 3/24/25 3/24/26 Yes Provider, MD Angelina   metaxalone (SKELAXIN) 800 MG tablet Take 1 tablet by mouth 2 (Two) Times a Day. 1/12/24  Yes Provider, Historical, MD   montelukast (SINGULAIR) 10 MG tablet montelukast 10 mg oral tablet take 1 tablet (10 mg) by oral route once daily in the evening   Active   Yes Provider, Historical, MD   propranolol LA (INDERAL LA) 60 MG 24 hr capsule Take 1 capsule by mouth Daily. 5/2/25  Yes Provider, MD Angelina   sildenafil (VIAGRA) 100 MG tablet Take 0.5-1 tablets by mouth. 4/14/25 4/14/26 Yes Provider, MD Angelina   tamsulosin (FLOMAX) 0.4 MG capsule 24 hr capsule 1 capsule Daily.   Yes Provider, Historical, MD   tolterodine LA (DETROL LA) 2 MG 24 hr capsule Take 1 capsule by mouth Daily. 3/30/21  Yes Provider, MD Angelina   topiramate (TOPAMAX) 25 MG tablet Take 1 tablet by mouth. 2/27/25 5/28/25 Yes Provider, MD Angelina   ubrogepant (UBRELVY) 100 MG tablet Take 1 tablet by mouth. 5/2/25  Yes Provider, MD Angelina   vitamin D3 125 MCG (5000 UT) capsule capsule Take 1 capsule by mouth Daily. 5/2/21  Yes Provider, Historical, MD        Allergies:  Patient has no known allergies.    Objective     Vital Signs     "    Physical Exam:     General Appearance:    Alert, cooperative, in no acute distress   Head:    Normocephalic, without obvious abnormality, atraumatic   Eyes:          Conjunctivae and sclerae normal, no icterus,     Ears:    Ears appear intact with no abnormalities noted   Throat:   No oral lesions, no thrush, oral mucosa moist   Neck:   No adenopathy, supple, trachea midline, no thyromegaly   Back:     No kyphosis present, no scoliosis present, no skin lesions,      erythema or scars, no tenderness to percussion or                   palpation,   range of motion normal   Lungs:     Clear to auscultation,respirations regular, even and                  unlabored    Heart:    Regular rhythm and normal rate, normal S1 and S2, no            murmur, no gallop, no rub, no click   Chest Wall:    No abnormalities observed   Abdomen:     Normal bowel sounds, no masses, no organomegaly, soft        non-tender, non-distended, no guarding, no rebound                tenderness   Rectal:   No external condyloma   Extremities:   Moves all extremities well, no edema, no cyanosis, no             redness   Pulses:   Pulses palpable and equal bilaterally   Skin:   No bleeding, bruising or rash   Lymph nodes:   No palpable adenopathy   Neurologic:   A/o x 4 with no deficits       Results Review:   {Results Review:[id number]::\"I reviewed the patient's new clinical results.\"    LABS/IMAGING:  Results for orders placed or performed during the hospital encounter of 10/16/24   Tissue Pathology Exam    Collection Time: 10/16/24 11:27 AM    Specimen: A: Anus; Tissue    B: Anus; Tissue    C: Anus; Tissue    D: Anus; Tissue   Result Value Ref Range    Case Report       Surgical Pathology Report                         Case: [id number]                                  Authorizing Provider:  Daljit Issa MD  Collected:           10/16/2024 11:27 AM          Ordering Location:     [facility]  Received:            10/16/2024 " "01:35 PM                                 OR                                                                           Pathologist:           Ned Tinoco MD                                                            Specimens:   1) - Anus, right anal canal                                                                         2) - Anus, Anterior anal canal                                                                      3) - Anus, posterior anal canal                                                                     4) - Anus, left anal canal                                                                 Clinical Information       Anal condyloma      Final Diagnosis       1. Right anal canal, biopsy:   - Low grade squamous intraepithelial lesion (AIN 1)      2. Anterior anal canal, biopsy:   - Squamous mucosa with no significant pathologic change   - Negative for dysplasia and malignancy      3. Posterior anal canal, biopsy:   - Squamocolumnar mucosa with no significant pathologic change   - Negative for dysplasia and malignancy      4. Left anal canal, biopsy:   - Squamocolumnar mucosa with no significant pathologic change   - Negative for dysplasia and malignancy      Gross Description       1. Anus.  Received in formalin and labeled \" right anal canal\" is a 0.4 cm fragment of tan soft tissue. The specimen is entirely submitted in one cassette.    2. Anus.  Received in formalin and labeled \" anterior anal canal\" is a 0.6 cm fragment of tan soft tissue. The specimen is entirely submitted in one cassette.    3. Anus.  Received in formalin and labeled \" posterior anal canal\" is a 0.4 cm fragment of tan soft tissue. The specimen is entirely submitted in one cassette.    4. Anus.  Received in formalin and labeled \" left anal canal\" is a 0.4 cm fragment of tan soft tissue. The specimen is entirely submitted in one cassette.   DIOGENES      Microscopic Description       Microscopic examination performed.      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  Pathology with right anal canal low-grade squamous intraepithelial lesion, anterior anal canal normal, posterior anal canal normal, left anal canal normal.    History of anemia undergoing colonoscopy by Dr. Jaffe 9/16/2024 with biopsy of suspected condyloma in the anus with pathology high-grade squamous intraepithelial lesion AIN 2 3    HIV testing negative in 2018.    No evidence of recurrence of anal condyloma    I recommended a anoscopy in the office today for surveillance.  Benefits and alternatives discussed.  Risk procedure including bleeding and pain discussed.  Consent obtained.  He tolerated the procedure well.  No evidence of recurrence.  Follow-up with me in 6 months.  All questions answered.  He agrees with the plan.  Thank you for the consult.    Procedure note  Preoperative and postoperative diagnosis history of anal condyloma with high-grade dysplasia, procedure anoscopy, Surgeon Maegan, estimated blood loss none, complications none, findings no evidence of recurrence of condyloma              This document has been electronically signed by Daljit Issa MD  May 9, 2025 07:19 EDT

## 2025-08-11 ENCOUNTER — PREP FOR SURGERY (OUTPATIENT)
Dept: OTHER | Facility: HOSPITAL | Age: 60
End: 2025-08-11
Payer: COMMERCIAL

## 2025-08-11 ENCOUNTER — CLINICAL SUPPORT (OUTPATIENT)
Dept: GASTROENTEROLOGY | Facility: CLINIC | Age: 60
End: 2025-08-11
Payer: COMMERCIAL

## 2025-08-11 DIAGNOSIS — Z12.11 ENCOUNTER FOR SCREENING FOR MALIGNANT NEOPLASM OF COLON: ICD-10-CM

## 2025-08-11 DIAGNOSIS — A63.0 ANAL CONDYLOMA: Primary | ICD-10-CM

## 2025-08-11 RX ORDER — POLYETHYLENE GLYCOL 3350, SODIUM SULFATE, SODIUM CHLORIDE, POTASSIUM CHLORIDE, ASCORBIC ACID, SODIUM ASCORBATE 140-9-5.2G
1 KIT ORAL TAKE AS DIRECTED
Qty: 1 EACH | Refills: 0 | Status: SHIPPED | OUTPATIENT
Start: 2025-08-11

## 2025-08-11 RX ORDER — SIMVASTATIN 40 MG
40 TABLET ORAL NIGHTLY
COMMUNITY

## 2025-08-11 RX ORDER — NAPROXEN 500 MG/1
TABLET ORAL EVERY 12 HOURS SCHEDULED
COMMUNITY

## 2025-08-11 RX ORDER — SUMATRIPTAN 50 MG/1
TABLET, FILM COATED ORAL EVERY 24 HOURS
COMMUNITY

## (undated) DEVICE — SYR LUERLOK 30CC

## (undated) DEVICE — MINOR-LF: Brand: MEDLINE INDUSTRIES, INC.

## (undated) DEVICE — Device

## (undated) DEVICE — DRAPE,UNDERBUTTOCKS,PCH,STERILE: Brand: MEDLINE

## (undated) DEVICE — STERILE POLYISOPRENE POWDER-FREE SURGICAL GLOVES WITH EMOLLIENT COATING: Brand: PROTEXIS

## (undated) DEVICE — VAGINAL PREP TRAY: Brand: MEDLINE INDUSTRIES, INC.

## (undated) DEVICE — DRSNG PAD ABD 8X10IN STRL

## (undated) DEVICE — LINER SURG CANSTR SXN S/RIGD 1500CC

## (undated) DEVICE — DRESSING,GAUZE,XEROFORM,CURAD,5"X9",ST: Brand: CURAD

## (undated) DEVICE — MEDI-VAC NON-CONDUCTIVE SUCTION TUBING: Brand: CARDINAL HEALTH

## (undated) DEVICE — PREFLTR SMOKE/EVAC SURGIFRESH PROTECTION PLS 1 1/3IN

## (undated) DEVICE — LEGGINGS, PAIR, CLEAR, STERILE: Brand: MEDLINE

## (undated) DEVICE — CONN JET HYDRA H20 AUXILIARY DISP

## (undated) DEVICE — YANKAUER,BULB TIP,W/O VENT,RIGID,STERILE: Brand: MEDLINE

## (undated) DEVICE — Device: Brand: DEFENDO AIR/WATER/SUCTION AND BIOPSY VALVE

## (undated) DEVICE — SOL IRRG H2O PL/BG 1000ML STRL

## (undated) DEVICE — STERILE POLYISOPRENE POWDER-FREE SURGICAL GLOVES: Brand: PROTEXIS

## (undated) DEVICE — SINGLE-USE BIOPSY FORCEPS: Brand: RADIAL JAW 4

## (undated) DEVICE — STERILE COTTON BALLS LARGE 5/P: Brand: MEDLINE

## (undated) DEVICE — GAUZE,SPONGE,4"X4",16PLY,STRL,LF,10/TRAY: Brand: MEDLINE

## (undated) DEVICE — TOWEL,OR,DSP,ST,BLUE,STD,4/PK,20PK/CS: Brand: MEDLINE

## (undated) DEVICE — TBG EVAC WAND/SPONGEGUARD 7/8IN 10FT STRL

## (undated) DEVICE — PENCL SMOKE/EVAC MEGADYNE TELESCP 10FT

## (undated) DEVICE — KENDALL SCD EXPRESS SLEEVES, KNEE LENGTH, MEDIUM: Brand: KENDALL SCD

## (undated) DEVICE — BRIEF KNIT SEAMLSS PREM 70IN 3XL PK/2

## (undated) DEVICE — BLD MYRNGTMY FLT ARROW/JUVENILE DISP

## (undated) DEVICE — SYR LL TP 10ML STRL

## (undated) DEVICE — GLV SURG BIOGEL LTX PF 7 1/2

## (undated) DEVICE — BLCK/BITE BLOX WO/DENTL/RIM W/STRAP 54F

## (undated) DEVICE — STRAP STIRUP SLP RNG 19X3.5IN DISP

## (undated) DEVICE — SOLIDIFIER LIQLOC PLS 1500CC BT